# Patient Record
Sex: FEMALE | Race: WHITE | NOT HISPANIC OR LATINO | Employment: OTHER | URBAN - METROPOLITAN AREA
[De-identification: names, ages, dates, MRNs, and addresses within clinical notes are randomized per-mention and may not be internally consistent; named-entity substitution may affect disease eponyms.]

---

## 2017-03-03 ENCOUNTER — HOSPITAL ENCOUNTER (OUTPATIENT)
Dept: RADIOLOGY | Age: 72
Discharge: HOME | End: 2017-03-03
Payer: MEDICARE

## 2017-03-03 DIAGNOSIS — Z12.31 ENCOUNTER FOR SCREENING MAMMOGRAM FOR MALIGNANT NEOPLASM OF BREAST: ICD-10-CM

## 2017-03-03 PROCEDURE — G0202 SCR MAMMO BI INCL CAD: HCPCS

## 2021-02-11 DIAGNOSIS — Z23 ENCOUNTER FOR IMMUNIZATION: ICD-10-CM

## 2021-02-13 ENCOUNTER — IMMUNIZATIONS (OUTPATIENT)
Dept: FAMILY MEDICINE CLINIC | Facility: HOSPITAL | Age: 76
End: 2021-02-13

## 2021-02-13 DIAGNOSIS — Z23 ENCOUNTER FOR IMMUNIZATION: Primary | ICD-10-CM

## 2021-02-13 PROCEDURE — 0001A SARS-COV-2 / COVID-19 MRNA VACCINE (PFIZER-BIONTECH) 30 MCG: CPT

## 2021-02-13 PROCEDURE — 91300 SARS-COV-2 / COVID-19 MRNA VACCINE (PFIZER-BIONTECH) 30 MCG: CPT

## 2021-03-06 ENCOUNTER — IMMUNIZATIONS (OUTPATIENT)
Dept: FAMILY MEDICINE CLINIC | Facility: HOSPITAL | Age: 76
End: 2021-03-06

## 2021-03-06 DIAGNOSIS — Z23 ENCOUNTER FOR IMMUNIZATION: Primary | ICD-10-CM

## 2021-03-06 PROCEDURE — 0002A SARS-COV-2 / COVID-19 MRNA VACCINE (PFIZER-BIONTECH) 30 MCG: CPT

## 2021-03-06 PROCEDURE — 91300 SARS-COV-2 / COVID-19 MRNA VACCINE (PFIZER-BIONTECH) 30 MCG: CPT

## 2021-10-09 ENCOUNTER — IMMUNIZATIONS (OUTPATIENT)
Dept: FAMILY MEDICINE CLINIC | Facility: HOSPITAL | Age: 76
End: 2021-10-09

## 2021-10-09 DIAGNOSIS — Z23 ENCOUNTER FOR IMMUNIZATION: Primary | ICD-10-CM

## 2021-10-09 PROCEDURE — 91300 SARS-COV-2 / COVID-19 MRNA VACCINE (PFIZER-BIONTECH) 30 MCG: CPT

## 2021-10-09 PROCEDURE — 0001A SARS-COV-2 / COVID-19 MRNA VACCINE (PFIZER-BIONTECH) 30 MCG: CPT

## 2023-05-16 ENCOUNTER — HOSPITAL ENCOUNTER (OUTPATIENT)
Dept: RADIOLOGY | Facility: HOSPITAL | Age: 78
Discharge: HOME | End: 2023-05-16

## 2023-05-16 DIAGNOSIS — J44.9 CHRONIC OBSTRUCTIVE PULMONARY DISEASE, UNSPECIFIED COPD TYPE (HCC): ICD-10-CM

## 2023-06-12 ENCOUNTER — HOSPITAL ENCOUNTER (OUTPATIENT)
Dept: PULMONOLOGY | Facility: HOSPITAL | Age: 78
Discharge: HOME | End: 2023-06-12
Payer: MEDICARE

## 2023-06-12 DIAGNOSIS — J44.9 CHRONIC OBSTRUCTIVE PULMONARY DISEASE, UNSPECIFIED (HCC): ICD-10-CM

## 2023-06-12 PROCEDURE — 94729 DIFFUSING CAPACITY: CPT

## 2023-06-12 PROCEDURE — 94060 EVALUATION OF WHEEZING: CPT

## 2023-06-12 PROCEDURE — 94060 EVALUATION OF WHEEZING: CPT | Performed by: INTERNAL MEDICINE

## 2023-06-12 PROCEDURE — 94726 PLETHYSMOGRAPHY LUNG VOLUMES: CPT | Performed by: INTERNAL MEDICINE

## 2023-06-12 PROCEDURE — 94726 PLETHYSMOGRAPHY LUNG VOLUMES: CPT

## 2023-06-12 PROCEDURE — 94729 DIFFUSING CAPACITY: CPT | Performed by: INTERNAL MEDICINE

## 2023-06-12 PROCEDURE — 94760 N-INVAS EAR/PLS OXIMETRY 1: CPT

## 2023-06-12 RX ORDER — ALBUTEROL SULFATE 2.5 MG/3ML
2.5 SOLUTION RESPIRATORY (INHALATION) ONCE
Status: COMPLETED | OUTPATIENT
Start: 2023-06-12 | End: 2023-06-12

## 2023-06-12 RX ADMIN — ALBUTEROL SULFATE 2.5 MG: 2.5 SOLUTION RESPIRATORY (INHALATION) at 15:07

## 2024-03-14 ENCOUNTER — APPOINTMENT (EMERGENCY)
Dept: RADIOLOGY | Facility: HOSPITAL | Age: 79
End: 2024-03-14
Payer: MEDICARE

## 2024-03-14 ENCOUNTER — HOSPITAL ENCOUNTER (EMERGENCY)
Facility: HOSPITAL | Age: 79
Discharge: HOME | End: 2024-03-14
Attending: EMERGENCY MEDICINE
Payer: MEDICARE

## 2024-03-14 ENCOUNTER — APPOINTMENT (EMERGENCY)
Dept: CT IMAGING | Facility: HOSPITAL | Age: 79
End: 2024-03-14
Payer: MEDICARE

## 2024-03-14 VITALS
DIASTOLIC BLOOD PRESSURE: 59 MMHG | HEART RATE: 93 BPM | HEIGHT: 64 IN | SYSTOLIC BLOOD PRESSURE: 118 MMHG | WEIGHT: 93.03 LBS | BODY MASS INDEX: 15.88 KG/M2 | RESPIRATION RATE: 20 BRPM | TEMPERATURE: 98.8 F | OXYGEN SATURATION: 92 %

## 2024-03-14 DIAGNOSIS — J90 PLEURAL EFFUSION ON RIGHT: ICD-10-CM

## 2024-03-14 DIAGNOSIS — J18.9 PNEUMONIA OF RIGHT UPPER LOBE DUE TO INFECTIOUS ORGANISM: Primary | ICD-10-CM

## 2024-03-14 DIAGNOSIS — J43.9 COPD WITH EMPHYSEMA (HCC): ICD-10-CM

## 2024-03-14 LAB
2HR DELTA HS TROPONIN: -1 NG/L
ALBUMIN SERPL BCP-MCNC: 4.1 G/DL (ref 3.5–5)
ALP SERPL-CCNC: 105 U/L (ref 34–104)
ALT SERPL W P-5'-P-CCNC: 27 U/L (ref 7–52)
ANION GAP SERPL CALCULATED.3IONS-SCNC: 8 MMOL/L (ref 4–13)
AST SERPL W P-5'-P-CCNC: 24 U/L (ref 13–39)
BASOPHILS # BLD AUTO: 0.03 THOUSANDS/ÂΜL (ref 0–0.1)
BASOPHILS NFR BLD AUTO: 0 % (ref 0–1)
BILIRUB SERPL-MCNC: 0.57 MG/DL (ref 0.2–1)
BUN SERPL-MCNC: 14 MG/DL (ref 5–25)
CALCIUM SERPL-MCNC: 9.7 MG/DL (ref 8.4–10.2)
CARDIAC TROPONIN I PNL SERPL HS: 4 NG/L
CARDIAC TROPONIN I PNL SERPL HS: 5 NG/L
CHLORIDE SERPL-SCNC: 100 MMOL/L (ref 96–108)
CO2 SERPL-SCNC: 29 MMOL/L (ref 21–32)
CREAT SERPL-MCNC: 0.74 MG/DL (ref 0.6–1.3)
EOSINOPHIL # BLD AUTO: 0 THOUSAND/ÂΜL (ref 0–0.61)
EOSINOPHIL NFR BLD AUTO: 0 % (ref 0–6)
ERYTHROCYTE [DISTWIDTH] IN BLOOD BY AUTOMATED COUNT: 12.8 % (ref 11.6–15.1)
GFR SERPL CREATININE-BSD FRML MDRD: 77 ML/MIN/1.73SQ M
GLUCOSE SERPL-MCNC: 201 MG/DL (ref 65–140)
HCT VFR BLD AUTO: 49.9 % (ref 34.8–46.1)
HGB BLD-MCNC: 15.8 G/DL (ref 11.5–15.4)
IMM GRANULOCYTES # BLD AUTO: 0.06 THOUSAND/UL (ref 0–0.2)
IMM GRANULOCYTES NFR BLD AUTO: 1 % (ref 0–2)
LACTATE SERPL-SCNC: 1.2 MMOL/L (ref 0.5–2)
LYMPHOCYTES # BLD AUTO: 0.77 THOUSANDS/ÂΜL (ref 0.6–4.47)
LYMPHOCYTES NFR BLD AUTO: 6 % (ref 14–44)
MCH RBC QN AUTO: 30.8 PG (ref 26.8–34.3)
MCHC RBC AUTO-ENTMCNC: 31.7 G/DL (ref 31.4–37.4)
MCV RBC AUTO: 97 FL (ref 82–98)
MONOCYTES # BLD AUTO: 1.03 THOUSAND/ÂΜL (ref 0.17–1.22)
MONOCYTES NFR BLD AUTO: 8 % (ref 4–12)
NEUTROPHILS # BLD AUTO: 10.33 THOUSANDS/ÂΜL (ref 1.85–7.62)
NEUTS SEG NFR BLD AUTO: 85 % (ref 43–75)
NRBC BLD AUTO-RTO: 0 /100 WBCS
PLATELET # BLD AUTO: 225 THOUSANDS/UL (ref 149–390)
PMV BLD AUTO: 11.6 FL (ref 8.9–12.7)
POTASSIUM SERPL-SCNC: 3.9 MMOL/L (ref 3.5–5.3)
PROT SERPL-MCNC: 7.6 G/DL (ref 6.4–8.4)
RBC # BLD AUTO: 5.13 MILLION/UL (ref 3.81–5.12)
SODIUM SERPL-SCNC: 137 MMOL/L (ref 135–147)
WBC # BLD AUTO: 12.22 THOUSAND/UL (ref 4.31–10.16)

## 2024-03-14 PROCEDURE — 85025 COMPLETE CBC W/AUTO DIFF WBC: CPT | Performed by: EMERGENCY MEDICINE

## 2024-03-14 PROCEDURE — 71045 X-RAY EXAM CHEST 1 VIEW: CPT

## 2024-03-14 PROCEDURE — 83605 ASSAY OF LACTIC ACID: CPT | Performed by: PHYSICIAN ASSISTANT

## 2024-03-14 PROCEDURE — 80053 COMPREHEN METABOLIC PANEL: CPT | Performed by: EMERGENCY MEDICINE

## 2024-03-14 PROCEDURE — 93005 ELECTROCARDIOGRAM TRACING: CPT

## 2024-03-14 PROCEDURE — 36415 COLL VENOUS BLD VENIPUNCTURE: CPT

## 2024-03-14 PROCEDURE — 84484 ASSAY OF TROPONIN QUANT: CPT | Performed by: EMERGENCY MEDICINE

## 2024-03-14 PROCEDURE — 71275 CT ANGIOGRAPHY CHEST: CPT

## 2024-03-14 PROCEDURE — 99285 EMERGENCY DEPT VISIT HI MDM: CPT | Performed by: PHYSICIAN ASSISTANT

## 2024-03-14 PROCEDURE — 99285 EMERGENCY DEPT VISIT HI MDM: CPT

## 2024-03-14 RX ADMIN — IOHEXOL 45 ML: 350 INJECTION, SOLUTION INTRAVENOUS at 11:17

## 2024-03-14 NOTE — ED NOTES
Patient's O2 saturation was at 93% while resting and remained between 92-93% while ambulating.     Eloy Kerr  03/14/24 1223

## 2024-03-14 NOTE — ED PROVIDER NOTES
History  Chief Complaint   Patient presents with    Chest Pain     Pt reports chest and back pain onset yesterday, was seen yesterday at urgent care and reports was called for elevated d-dimer, cough/SOB, hx COPD     Patient is a 79-year-old female with PMH of smoking and COPD, presenting to the ED after receiving a phone call from [facility] this morning that she has an elevated D-dimer from her visit yesterday and that she should be seen at the emergency department. At [facility] she was diagnosed with a right upper lobe pneumonia and was given a course of doxycycline and prednisone. She has been experiencing some right-sided chest pain that radiates to her back. She also reports a cough and some shortness of breath, but states that she always has that due to her COPD. Her O2 sat on arrival was 93% and she states her baseline is about 95%, she is not on oxygen at home.         None       Past Medical History:   Diagnosis Date    COPD (chronic obstructive pulmonary disease) (HCC)        History reviewed. No pertinent surgical history.    History reviewed. No pertinent family history.  I have reviewed and agree with the history as documented.    E-Cigarette/Vaping     E-Cigarette/Vaping Substances     Social History     Tobacco Use    Smoking status: Former     Types: Cigarettes    Smokeless tobacco: Never       Review of Systems   Constitutional:  Negative for activity change, chills, diaphoresis, fatigue and fever.   Respiratory:  Positive for shortness of breath. Negative for chest tightness and wheezing.    Cardiovascular:  Positive for chest pain. Negative for leg swelling.   Gastrointestinal:  Negative for abdominal pain, diarrhea, nausea and vomiting.   Skin:  Negative for color change and pallor.   Neurological:  Negative for dizziness, syncope and light-headedness.   All other systems reviewed and are negative.      Physical Exam  Physical Exam  Constitutional:       General: She is not in acute  distress.     Appearance: She is not ill-appearing, toxic-appearing or diaphoretic.   Cardiovascular:      Rate and Rhythm: Regular rhythm. Tachycardia present.      Heart sounds: Normal heart sounds.   Pulmonary:      Effort: No accessory muscle usage or respiratory distress.      Breath sounds: Examination of the right-upper field reveals rhonchi. Rhonchi present. No decreased breath sounds, wheezing or rales.   Musculoskeletal:      Right lower leg: No tenderness. No edema.      Left lower leg: No tenderness. No edema.   Skin:     General: Skin is warm and dry.   Neurological:      General: No focal deficit present.      Mental Status: She is alert and oriented to person, place, and time.   Psychiatric:         Mood and Affect: Mood normal.         Behavior: Behavior normal.         Vital Signs  ED Triage Vitals [03/14/24 0952]   Temperature Pulse Respirations Blood Pressure SpO2   98.8 °F (37.1 °C) (!) 115 16 119/84 93 %      Temp Source Heart Rate Source Patient Position - Orthostatic VS BP Location FiO2 (%)   Oral Monitor Sitting Right arm --      Pain Score       --           Vitals:    03/14/24 0952 03/14/24 1110   BP: 119/84 118/59   Pulse: (!) 115 93   Patient Position - Orthostatic VS: Sitting Lying         Visual Acuity      ED Medications  Medications   iohexol (OMNIPAQUE) 350 MG/ML injection (MULTI-DOSE) 45 mL (45 mL Intravenous Given 3/14/24 1117)       Diagnostic Studies  Results Reviewed       Procedure Component Value Units Date/Time    HS Troponin I 2hr [[id number]]  (Normal) Collected: 03/14/24 1155    Lab Status: Final result Specimen: Blood from Arm, Left Updated: 03/14/24 1225     hs TnI 2hr 4 ng/L      Delta 2hr hsTnI -1 ng/L     Lactic acid, plasma (w/reflex if result > 2.0) [[id number]]  (Normal) Collected: 03/14/24 1155    Lab Status: Final result Specimen: Blood from Arm, Left Updated: 03/14/24 1215     LACTIC ACID 1.2 mmol/L     Narrative:      Result may be elevated if tourniquet was used  during collection.    Comprehensive metabolic panel [[id number]]  (Abnormal) Collected: 03/14/24 1001    Lab Status: Final result Specimen: Blood from Arm, Left Updated: 03/14/24 1044     Sodium 137 mmol/L      Potassium 3.9 mmol/L      Chloride 100 mmol/L      CO2 29 mmol/L      ANION GAP 8 mmol/L      BUN 14 mg/dL      Creatinine 0.74 mg/dL      Glucose 201 mg/dL      Calcium 9.7 mg/dL      AST 24 U/L      ALT 27 U/L      Alkaline Phosphatase 105 U/L      Total Protein 7.6 g/dL      Albumin 4.1 g/dL      Total Bilirubin 0.57 mg/dL      eGFR 77 ml/min/1.73sq m     Narrative:      National Kidney Disease Foundation guidelines for Chronic Kidney Disease (CKD):     Stage 1 with normal or high GFR (GFR > 90 mL/min/1.73 square meters)    Stage 2 Mild CKD (GFR = 60-89 mL/min/1.73 square meters)    Stage 3A Moderate CKD (GFR = 45-59 mL/min/1.73 square meters)    Stage 3B Moderate CKD (GFR = 30-44 mL/min/1.73 square meters)    Stage 4 Severe CKD (GFR = 15-29 mL/min/1.73 square meters)    Stage 5 End Stage CKD (GFR <15 mL/min/1.73 square meters)  Note: GFR calculation is accurate only with a steady state creatinine    HS Troponin 0hr (reflex protocol) [[id number]]  (Normal) Collected: 03/14/24 1001    Lab Status: Final result Specimen: Blood from Arm, Left Updated: 03/14/24 1037     hs TnI 0hr 5 ng/L     CBC and differential [[id number]]  (Abnormal) Collected: 03/14/24 1001    Lab Status: Final result Specimen: Blood from Arm, Left Updated: 03/14/24 1015     WBC 12.22 Thousand/uL      RBC 5.13 Million/uL      Hemoglobin 15.8 g/dL      Hematocrit 49.9 %      MCV 97 fL      MCH 30.8 pg      MCHC 31.7 g/dL      RDW 12.8 %      MPV 11.6 fL      Platelets 225 Thousands/uL      nRBC 0 /100 WBCs      Neutrophils Relative 85 %      Immature Grans % 1 %      Lymphocytes Relative 6 %      Monocytes Relative 8 %      Eosinophils Relative 0 %      Basophils Relative 0 %      Neutrophils Absolute 10.33 Thousands/µL      Absolute Immature  Grans 0.06 Thousand/uL      Absolute Lymphocytes 0.77 Thousands/µL      Absolute Monocytes 1.03 Thousand/µL      Eosinophils Absolute 0.00 Thousand/µL      Basophils Absolute 0.03 Thousands/µL                    CTA ED chest PE Study   Final Result by Chiquita Kee MD (03/14 1151)      No pulmonary embolus.      Mild right upper lobe pneumonia with trace right pleural effusion.      Severe emphysema.            Workstation performed: [id number]         XR chest portable   Final Result by Amrita Nicole MD (03/14 1434)      Emphysematous changes. Small focal opacity in the right upper lobe may represent developing pneumonia.            Workstation performed: [id number]                    Procedures  ECG 12 Lead Documentation Only    Date/Time: 3/14/2024 9:56 AM    Performed by: Satish Guerra PA-C  Authorized by: Satish Guerra PA-C    ECG reviewed by me, the ED Provider: yes    Patient location:  ED  Previous ECG:     Comparison to cardiac monitor: Yes    Interpretation:     Interpretation: normal    Rate:     ECG rate assessment: tachycardic    Rhythm:     Rhythm: sinus rhythm    Ectopy:     Ectopy: none    QRS:     QRS axis:  Normal  Conduction:     Conduction: normal    ST segments:     ST segments:  Normal  T waves:     T waves: normal    Comments:      Sinus tachycardia, otherwise normal EKG           ED Course  ED Course as of 03/17/24 1840   Thu Mar 14, 2024   1030 Discussed with staff need for EKG, tech partner performing    1148 RUL infiltrate c/w PNA.  NO obvious PE by my interpretation.  Lactate / fluids / ABx ordered.  PE official itnterpretation pending               HEART Risk Score      Flowsheet Row Most Recent Value   Heart Score Risk Calculator    History 0 Filed at: 03/14/2024 1326   ECG 0 Filed at: 03/14/2024 1326   Age 2 Filed at: 03/14/2024 1326   Risk Factors 1 Filed at: 03/14/2024 1326   Troponin 0 Filed at: 03/14/2024 1326   HEART Score 3 Filed at: 03/14/2024 1326                   PERC Rule for PE      Flowsheet Row Most Recent Value   PERC Rule for PE    Age >=50 1 Filed at: 03/14/2024 1047   HR >=100 1 Filed at: 03/14/2024 1047   O2 Sat on room air < 95% 1 Filed at: 03/14/2024 1047   History of PE or DVT 0 Filed at: 03/14/2024 1047   Recent trauma or surgery 0 Filed at: 03/14/2024 1047   Hemoptysis 0 Filed at: 03/14/2024 1047   Exogenous estrogen 0 Filed at: 03/14/2024 1047   Unilateral leg swelling 0 Filed at: 03/14/2024 1047   PERC Rule for PE Results 3 Filed at: 03/14/2024 1047                    Wells' Criteria for PE      Flowsheet Row Most Recent Value   Wells' Criteria for PE    Clinical signs and symptoms of DVT 0 Filed at: 03/14/2024 1047   PE is primary diagnosis or equally likely 3 Filed at: 03/14/2024 1047   HR >100 1.5 Filed at: 03/14/2024 1047   Immobilization at least 3 days or Surgery in the previous 4 weeks 0 Filed at: 03/14/2024 1047   Previous, objectively diagnosed PE or DVT 0 Filed at: 03/14/2024 1047   Hemoptysis 0 Filed at: 03/14/2024 1047   Malignancy with treatment within 6 months or palliative 0 Filed at: 03/14/2024 1047   Wells' Criteria Total 4.5 Filed at: 03/14/2024 1047                  Medical Decision Making  Differential diagnosis includes: PE, ACS, pneumonia, pneumothorax, COPD exacerbation, viral pleurisy. CTA chest showed no pulmonary embolus or pneumothorax, just right upper lobe infiltrate consistent with the diagnosis of pneumonia she was given yesterday. Her exam was straightforward and there was no immediate life threat. Exam was not consistent with cardiac source and her HEART score was low. She maintained 92-93% O2 sat while ambulating. She was advised to continue the doxycycline that she was prescribed yesterday and to finish the whole course of 10 days. Her pain resolved while in the ED.     Discussed return emergency department for any newly developing or worsening signs or symptoms.  Patient understood all instructions prior to  discharge and plan agreed upon by patient and myself. Discussed overall common severe and commonly common side effects of prescription medication and advised review of all prescription package inserts for personal review prior to taking prescribed medications.      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  Radiology: ordered.    Risk  Prescription drug management.             Disposition  Final diagnoses:   Pneumonia of right upper lobe due to infectious organism   COPD with emphysema (HCC)   Pleural effusion on right     Time reflects when diagnosis was documented in both MDM as applicable and the Disposition within this note       Time User Action Codes Description Comment    3/14/2024 12:40 PM Satish Guerra Add [J18.9] Pneumonia of right upper lobe due to infectious organism     3/14/2024 12:44 PM Satish Guerra Add [J43.9] COPD with emphysema (HCC)     3/14/2024 12:44 PM Satish Guerra Add [J90] Pleural effusion on right           ED Disposition       ED Disposition   Discharge    Condition   Stable    Date/Time   Thu Mar 14, 2024 1239    Comment   Estela Javed discharge to home/self care.                   Follow-up Information       Follow up With Specialties Details Why Contact Info Additional Information    Addison Ha MD Internal Medicine Call in 1 day Follow-up for pneumonia to ensure it is not worsening [address]  [address]  [facility] [address]  [phone]       [facility] Emergency Medicine Go to  If symptoms worsen [facility] [facility] [address]  [phone] [facility], [facility] [place], [place], [address]            There are no discharge  medications for this patient.      No discharge procedures on file.    PDMP Review       None            ED Provider  Electronically Signed by             Satish Guerra PA-C  03/17/24 0519

## 2024-03-14 NOTE — DISCHARGE INSTRUCTIONS
Return emergency department for any newly developing or worsening signs or symptoms.  Ensure to review again any side effects of prescription medication(s) prescribed and review all prescription package inserts for personal review prior to taking prescribed medications.   Call to schedule a follow up appointment post ED follow up to address non emergent follow up findings and or to schedule follow up exam.   Continue doxycycline 100 mg twice daily for the full 10 days.

## 2024-03-16 LAB
ATRIAL RATE: 111 BPM
P AXIS: 87 DEGREES
PR INTERVAL: 144 MS
QRS AXIS: 65 DEGREES
QRSD INTERVAL: 82 MS
QT INTERVAL: 312 MS
QTC INTERVAL: 424 MS
T WAVE AXIS: 64 DEGREES
VENTRICULAR RATE: 111 BPM

## 2024-03-16 PROCEDURE — 93010 ELECTROCARDIOGRAM REPORT: CPT | Performed by: INTERNAL MEDICINE

## 2024-03-18 ENCOUNTER — TELEPHONE (OUTPATIENT)
Age: 79
End: 2024-03-18

## 2024-03-18 NOTE — TELEPHONE ENCOUNTER
Estela called in to see if there was a sooner appt with  . Offer the patient if she will like to be seen sooner with a different provider pt decline . Placed patient on wait list

## 2024-04-03 ENCOUNTER — OFFICE VISIT (OUTPATIENT)
Dept: PULMONOLOGY | Facility: CLINIC | Age: 79
End: 2024-04-03
Payer: MEDICARE

## 2024-04-03 VITALS
HEART RATE: 95 BPM | TEMPERATURE: 95.9 F | OXYGEN SATURATION: 91 % | DIASTOLIC BLOOD PRESSURE: 62 MMHG | SYSTOLIC BLOOD PRESSURE: 110 MMHG

## 2024-04-03 DIAGNOSIS — F17.210 CIGARETTE NICOTINE DEPENDENCE WITHOUT COMPLICATION: ICD-10-CM

## 2024-04-03 DIAGNOSIS — J44.9 COPD, SEVERE (HCC): Primary | ICD-10-CM

## 2024-04-03 DIAGNOSIS — J18.9 PNEUMONIA OF RIGHT UPPER LOBE DUE TO INFECTIOUS ORGANISM: ICD-10-CM

## 2024-04-03 PROCEDURE — 99204 OFFICE O/P NEW MOD 45 MIN: CPT | Performed by: INTERNAL MEDICINE

## 2024-04-03 RX ORDER — PREDNISONE 10 MG/1
TABLET ORAL
COMMUNITY
Start: 2024-03-13 | End: 2024-04-03

## 2024-04-03 RX ORDER — EPINEPHRINE 0.3 MG/.3ML
INJECTION SUBCUTANEOUS
COMMUNITY
Start: 2024-03-27

## 2024-04-03 RX ORDER — UMECLIDINIUM BROMIDE AND VILANTEROL TRIFENATATE 62.5; 25 UG/1; UG/1
POWDER RESPIRATORY (INHALATION)
COMMUNITY
Start: 2024-03-26

## 2024-04-03 RX ORDER — UBIDECARENONE 200 MG
200 CAPSULE ORAL DAILY
COMMUNITY

## 2024-04-03 RX ORDER — IPRATROPIUM BROMIDE AND ALBUTEROL 20; 100 UG/1; UG/1
SPRAY, METERED RESPIRATORY (INHALATION)
COMMUNITY
Start: 2024-03-20

## 2024-04-03 RX ORDER — ALBUTEROL SULFATE 90 UG/1
2 AEROSOL, METERED RESPIRATORY (INHALATION) EVERY 6 HOURS PRN
Qty: 18 G | Refills: 5 | Status: SHIPPED | OUTPATIENT
Start: 2024-04-03

## 2024-04-03 RX ORDER — DOXYCYCLINE HYCLATE 100 MG
TABLET ORAL
COMMUNITY
Start: 2024-03-13 | End: 2024-04-03

## 2024-04-03 NOTE — PROGRESS NOTES
Pulmonary Outpatient Consultation Note   Estela Javed 79 y.o. female MRN: [id number]  4/3/2024    Referring provider: No referring provider defined for this encounter.     Assessment/Plan:      COPD, severe (HCC)  We reviewed PFTs today.  She does have severe COPD with hyperinflation and she remains symptomatic despite use of Anoro Ellipta once daily.  I would favor transitioning her rescue inhaler from Combivent to albuterol to reduce the anticholinergic effects, as she has having some dry mouth.  She will be enrolling in pulmonary rehabilitation.  We briefly discussed the option of bronchoscopic lung volume reduction.  Based on PFTs from June 2023, she would be a candidate based on FEV1 and degree of hyperinflation, however her diffusion capacity was only 10% of predicted, which ideally should be greater than 20%.  More importantly, she would need to quit smoking a minimum of 4 months to be considered a candidate.  With her upcoming CT to ensure resolution of pneumonia, we will also perform with [place] protocol to analyze for whether there would be a target lobe to have more detailed discussion regarding valves.    Pneumonia of right upper lobe due to infectious organism  Repeat chest CT in June to ensure resolution of pneumonia.  Will also be performed with valve protocol as above    Cigarette nicotine dependence without complication  We discussed the importance of smoking cessation.  She verbalizes understanding.  Patient states she was able to quit for 5 years in the past and will do her very best to quit altogether now.    Visit orders:    Diagnoses and all orders for this visit:    COPD, severe (HCC)  -     albuterol (Ventolin HFA) 90 mcg/act inhaler; Inhale 2 puffs every 6 (six) hours as needed for wheezing  -     CT chest without contrast; Future    Cigarette nicotine dependence without complication    Pneumonia of right upper lobe due to infectious organism  -     CT chest without contrast;  Future    Other orders  -     Cholecalciferol 125 MCG (5000 UT) TABS; Take 5,000 Units by mouth daily  -     Coenzyme Q10 200 MG capsule; Take 200 mg by mouth daily  -     Discontinue: doxycycline hyclate (VIBRA-TABS) 100 mg tablet  -     EPINEPHrine (EPIPEN) 0.3 mg/0.3 mL SOAJ; INJECT 1 PEN IN THE MUSCLE SINGLE DOSE AS NEEDED FOR ANAPHYLAXIS. REPEAT DOSE IN 5-10 MINUTES IF SYMPTOMS PERSIST. CALL 911 OR GO TO ER IF NEEDED  -     Combivent Respimat inhaler; INHALE 1 PUFF BY MOUTH EVERY 4 HOURS AS DIRECTED AS NEEDED  -     Discontinue: predniSONE 10 mg tablet; Take 2 tablets (20 mg) by mouth daily for 3 days then 1 tablet (10 mg) daily for 3 days  -     Anoro Ellipta 62.5-25 MCG/ACT inhaler      History of Present Illness   HPI:  Estela Javed is a 79 y.o. female who to establish care related to COPD.  Patient reports having symptoms of COPD for several years, only recently given the formal diagnosis.  She was initially treated with Breztri, however felt that it caused increased congestion and more recently was transitioned to Anoro Ellipta.  She uses Combivent as a rescue inhaler.  She was recently seen at urgent care with chest discomfort.  D-dimer was elevated, prompting CT of the chest to be performed.  This was negative for pulmonary embolism, but did show right upper lobe pneumonia with small right pleural effusion.  She was treated with a course of doxycycline and also prescribed prednisone, though she only took 1 dose of that.  She has occasional mucus.  Denies hemoptysis.  Patient has a longstanding smoking history of approximately 30 pack years, currently smoking intermittently.  She was referred to pulmonary rehabilitation and will be starting in the near future.    Patient follows with Dr. Robertson from an allergy standpoint.  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   HENT:  Positive for postnasal  drip, rhinorrhea and sneezing. Negative for ear pain, sore throat and trouble swallowing.    Respiratory:  Positive for shortness of breath.    Cardiovascular:  Negative for chest pain.   Musculoskeletal:  Negative for myalgias.   Neurological:  Negative for headaches.   All other systems reviewed and are negative.      Historical Information   Past Medical History:   Diagnosis Date    COPD (chronic obstructive pulmonary disease) ([facility])     Emphysema of lung ([facility]) 5/2023    Pleural effusion 3/14/24    Pneumonia 3/13/24     Past Surgical History:   Procedure Laterality Date    CHOLECYSTECTOMY  5/2014     Family History   Problem Relation Age of Onset    COPD Father     COPD Brother        Occupational History: Retired     Social History     Tobacco Use   Smoking Status Some Days    Current packs/day: 0.25    Average packs/day: 0.3 packs/day for 49.7 years (12.4 ttl pk-yrs)    Types: Cigarettes    Start date: 8/8/1974   Smokeless Tobacco Never   Tobacco Comments    Quit [phone]       Meds/Allergies     Current Outpatient Medications:     albuterol (Ventolin HFA) 90 mcg/act inhaler, Inhale 2 puffs every 6 (six) hours as needed for wheezing, Disp: 18 g, Rfl: 5    Anoro Ellipta 62.5-25 MCG/ACT inhaler, , Disp: , Rfl:     Cholecalciferol 125 MCG (5000 UT) TABS, Take 5,000 Units by mouth daily, Disp: , Rfl:     Coenzyme Q10 200 MG capsule, Take 200 mg by mouth daily, Disp: , Rfl:     Combivent Respimat inhaler, INHALE 1 PUFF BY MOUTH EVERY 4 HOURS AS DIRECTED AS NEEDED, Disp: , Rfl:     EPINEPHrine (EPIPEN) 0.3 mg/0.3 mL SOAJ, INJECT 1 PEN IN THE MUSCLE SINGLE DOSE AS NEEDED FOR ANAPHYLAXIS. REPEAT DOSE IN 5-10 MINUTES IF SYMPTOMS PERSIST. CALL 911 OR GO TO ER IF NEEDED, Disp: , Rfl:   Allergies   Allergen Reactions    Bacteriophages Shortness Of Breath    Bee Venom Anaphylaxis    Ciprofloxacin Dizziness, Other (See Comments), Palpitations, Shortness Of Breath and Tremor     Tremors, heart palpitations  "   Sulfa Antibiotics Shortness Of Breath and Wheezing     Had trouble swallowing    Nitrofurantoin GI Intolerance and Nausea Only    Oxycodone-Aspirin Rash    Penicillins Diarrhea    Shellfish Allergy - Food Allergy Hives and Vomiting    Hornet Venom Itching and Rash    Mixed Vespid Venom Itching and Rash    Other Itching and Rash    Wasp Venom Itching and Rash    Yellow Jacket Venom Itching and Rash       Vitals: Blood pressure 110/62, pulse 95, temperature (!) 95.9 °F (35.5 °C), temperature source Tympanic, SpO2 91%., There is no height or weight on file to calculate BMI. Oxygen Therapy  SpO2: 91 %  Oxygen Therapy: None (Room air)    Physical Exam   Physical Exam  Constitutional:       General: She is not in acute distress.     Appearance: Normal appearance.   HENT:      Head: Normocephalic.      Mouth/Throat:      Pharynx: No oropharyngeal exudate.   Eyes:      General: No scleral icterus.  Neck:      Vascular: No JVD.   Cardiovascular:      Rate and Rhythm: Normal rate and regular rhythm.   Abdominal:      Palpations: Abdomen is soft.      Tenderness: There is no abdominal tenderness.   Musculoskeletal:         General: No deformity.      Cervical back: Neck supple.   Lymphadenopathy:      Cervical: No cervical adenopathy.   Skin:     General: Skin is warm and dry.   Neurological:      Mental Status: She is alert and oriented to person, place, and time.   Psychiatric:         Mood and Affect: Mood normal.         Labs: I have personally reviewed pertinent lab results.  Lab Results   Component Value Date    WBC 12.22 (H) 03/14/2024    HGB 15.8 (H) 03/14/2024    HCT 49.9 (H) 03/14/2024    MCV 97 03/14/2024     03/14/2024     Lab Results   Component Value Date    GLUCOSE 87 12/30/2014    CALCIUM 9.7 03/14/2024     12/30/2014    K 3.9 03/14/2024    CO2 29 03/14/2024     03/14/2024    BUN 14 03/14/2024    CREATININE 0.74 03/14/2024     No results found for: \"IGE\"  Lab Results   Component Value Date "    ALT 27 03/14/2024    AST 24 03/14/2024    GGT 98 (H) 12/30/2014    ALKPHOS 105 (H) 03/14/2024    BILITOT 0.5 12/30/2014       Imaging and other studies: I have personally reviewed pertinent reports.   and I have personally reviewed pertinent films in PACS  Chest CT on 3/14/2024 shows severe emphysema, no pulmonary embolism, patchy consolidation in the right upper lobe and small right pleural effusion    Pulmonary function testing:  Performed 6/12/2023 and personally interpreted  FEV1/FVC ratio 44%    FEV1 0.73, 33% predicted  FVC 59% predicted  No response to bronchodilators   % predicted   % predicted  DLCO corrected for hemoglobin 10 % predicted  PFTs show severe obstruction, moderate air trapping and severe diffusion impairment    Answers submitted by the patient for this visit:  Pulmonology Questionnaire (Submitted on 4/2/2024)  Chief Complaint: Primary symptoms  Chronicity: chronic  When did you first notice your symptoms?: more than 1 year ago  How often do your symptoms occur?: daily  Since you first noticed this problem, how has it changed?: waxing and waning  Do you have shortness of breath that occurs with effort or exertion?: Yes  Do you have ear congestion?: No  Do you have heartburn?: No  Do you have fatigue?: Yes  Do you have nasal congestion?: No  Do you have shortness of breath when lying flat?: No  Do you have shortness of breath when you wake up?: Yes  Do you have sweats?: No  Have you experienced weight loss?: No  Which of the following makes your symptoms worse?: climbing stairs, emotional stress, exercise, strenuous activity  Risk factors for lung disease: smoking/tobacco exposure

## 2024-04-03 NOTE — ASSESSMENT & PLAN NOTE
We reviewed PFTs today.  She does have severe COPD with hyperinflation and she remains symptomatic despite use of Anoro Ellipta once daily.  I would favor transitioning her rescue inhaler from Combivent to albuterol to reduce the anticholinergic effects, as she has having some dry mouth.  She will be enrolling in pulmonary rehabilitation.  We briefly discussed the option of bronchoscopic lung volume reduction.  Based on PFTs from June 2023, she would be a candidate based on FEV1 and degree of hyperinflation, however her diffusion capacity was only 10% of predicted, which ideally should be greater than 20%.  More importantly, she would need to quit smoking a minimum of 4 months to be considered a candidate.  With her upcoming CT to ensure resolution of pneumonia, we will also perform with [place] protocol to analyze for whether there would be a target lobe to have more detailed discussion regarding valves.

## 2024-04-03 NOTE — ASSESSMENT & PLAN NOTE
We discussed the importance of smoking cessation.  She verbalizes understanding.  Patient states she was able to quit for 5 years in the past and will do her very best to quit altogether now.

## 2024-04-03 NOTE — ASSESSMENT & PLAN NOTE
Repeat chest CT in June to ensure resolution of pneumonia.  Will also be performed with valve protocol as above

## 2024-05-03 PROBLEM — J18.9 PNEUMONIA OF RIGHT UPPER LOBE DUE TO INFECTIOUS ORGANISM: Status: RESOLVED | Noted: 2024-04-03 | Resolved: 2024-05-03

## 2024-05-08 ENCOUNTER — CLINICAL SUPPORT (OUTPATIENT)
Dept: PULMONOLOGY | Facility: CLINIC | Age: 79
End: 2024-05-08
Payer: MEDICARE

## 2024-05-08 DIAGNOSIS — J44.9 COPD, SEVERE (HCC): ICD-10-CM

## 2024-05-08 PROCEDURE — 94625 PHY/QHP OP PULM RHB W/O MNTR: CPT

## 2024-05-08 NOTE — PROGRESS NOTES
PULMONARY REHABILITATION   ASSESSMENT AND INDIVIDUALIZED TREATMENT PLAN  INITIAL     Today's date: May 8, 2024  # of Exercise Sessions Completed: 1 - Eval   Patient name: Estela Javed     : 1945       MRN: [id number]  PCP: Addison Ha MD  Referring Physician: Delmer Robertson MD  Pulmonologist: Mariajose Worrell DO     Provider: Eyad  Clinician: Estephania Castrejon MS, CEP      ASSESSMENT    PULMONARY   Dx:   Encounter Diagnosis   Name Primary?    COPD, severe (HCC)      Date of onset: 4/3/24  Description of Diagnosis: COPD, very severe - recently diagnosed, knew due to increased SOB at home   Other Pulmonary History: recent hospitalization due to pneumonia    COMMENTS: Looking at get qualified for [place]     PFT:    does have a PFT on file  FEV1/FVC ratio of 44% and an   FEV1 of 33% predicted  very severeobstruction.    Pulmonary Disease Risk Factors:  none    Medical History:   Past Medical History:   Diagnosis Date    COPD (chronic obstructive pulmonary disease) ([facility])     Emphysema of lung ([facility]) 2023    Pleural effusion 3/14/24    Pneumonia 3/13/24       Family History:  Family History   Problem Relation Age of Onset    COPD Father     COPD Brother        Allergies:   Bacteriophages, Bee venom, Ciprofloxacin, Sulfa antibiotics, Nitrofurantoin, Oxycodone-aspirin, Penicillins, Shellfish allergy - food allergy, Hornet venom, Mixed vespid venom, Other, Wasp venom, and Yellow jacket venom    Current Medications:   Current Outpatient Medications   Medication Sig Dispense Refill    albuterol (Ventolin HFA) 90 mcg/act inhaler Inhale 2 puffs every 6 (six) hours as needed for wheezing 18 g 5    Anoro Ellipta 62.5-25 MCG/ACT inhaler       Cholecalciferol 125 MCG (5000 UT) TABS Take 5,000 Units by mouth daily      Coenzyme Q10 200 MG capsule Take 200 mg by mouth daily      Combivent Respimat inhaler INHALE 1 PUFF BY MOUTH EVERY 4 HOURS AS DIRECTED AS NEEDED      EPINEPHrine (EPIPEN) 0.3 mg/0.3 mL SOAJ INJECT  "1 PEN IN THE MUSCLE SINGLE DOSE AS NEEDED FOR ANAPHYLAXIS. REPEAT DOSE IN 5-10 MINUTES IF SYMPTOMS PERSIST. CALL 911 OR GO TO ER IF NEEDED       No current facility-administered medications for this visit.       Medication compliance: Yes   Comments: Pt reports to be compliant with medications    Cultural needs: none    Readiness to change: Preparation:  (Getting ready to change)       EXERCISE ASSESSMENT:      FUNCTIONAL STATUS:  Current Status:  Occupation: retired  Recreation: [employer]   ADL’s:Capable of performing light ADLs only limited by Dyspnea, Weakness  [place]: No limitations  Home Exercise/Equipment: light walking - leisure  Other: none     Physical Limitations: herniated discs in lower back     Fall Risk: Low   Comments: Ambulates with a steady gait with no assist device    Initial Fitness Assessment: 6MWT:  Resting:    Resting:  BP: 104/70  HR: 86  SpO2: 96%  Dyspnea: 0, Exercise:  BP: 142/74  HR: 102  SpO2: 88%  Dyspnea: 6, METs:  2.2, and ECG Summary: NSR with occ PVCs       NUTRITION ASSESSMENT:    Height:   Ht Readings from Last 1 Encounters:   03/14/24 5' 4\" (1.626 m)       Weight control:    Starting weight: 93 lbs    Current weight:     Rate Your Plate Score: 65/81    Diabetes: N/A    Current Dietary Habits:  Minimal dairy   Rare meat eater   Has IBS - limites diet slightly     Drug/Alcohol Use: No      PSYCHOSOCIAL ASSESSMENT:    Depression screening:  PHQ-9 = 3    Interpretation:  1-4 = Minimal Depression  Last Assessed: 5/8/24    Anxiety screening:  THOMAS-7 = 1    Interpretation: 0-4  = Not anxious  Last Assessed: 5/8/24    Pt self-report of depression and anxiety:   Patient reports feelings of anxiety  Reports sufficient emotional support     Self-reported stress level:  5   Stressors:  house problems   Stress Management Tools: practice Relaxation Techniques, exercise, keep a positive mindset, spend time outside, and enjoy a hobby    Quality of Life Screen:  (Higher score " indicates disease impact on QOL)  [facility] COOP score: 25/40        Social Support: children, siblings, and friends  Community/Social Activities: none      Psychosocial Assessment as it relates to rehabilitation:   Patient denies issues with his/her family or home life that may affect their rehabilitation efforts.       OTHER CORE COMPONENT ASSESSMENT:    Tobacco Use: Brief counseling by cardiac rehab professional  Date: 5/8/24  Pt continues to smoke 0.25 ppd   Tobacco Intervention: Discussed barriers to quitting and steps to work toward a quit date    Hospitalizations in the past year: 1    Oxygen needs:   Rest:  room air  Exercise/physical activity:  room air  Sleep:   room air    Does Pt monitor home SpO2? yes   Average SpO2 at rest: 90%   Average SpO2 with ADLs/physical activity: 90%    Breathing Treatments:   Rescue Inhaler:  PRN  Maintenance Inhaler: Yes:  1 times per day  Nebulizer Treatments:  No  Patient practices breathing techniques at home:  Reviewed with patient on:  5/8/24      INDIVIDUALIZED TREATMENT PLAN    Patient will attend 23 monitored exercise sessions beginning 5/13/24.    See outlined plan of care below for specific patient goals in each component of care.      PATIENT SPECIFIC GOALS:     Exercise: (home exercise, ADLs)  reduced dependence on supplemental O2, attend pulmonary rehab regularly, decrease sitting time at home, start a walking program, begin a consistent exercise regimen , decreased rest needed with physical activity/exercise, increased muscular strength, increased energy, and increased stamina with ADLs  Improve exercise outside - walking    Nutrition: (wt control, diabetes management, dietary modifications)  increase water intake to reduce/thin mucus production eat less CO2 producing foods improve hydration weight gain   Psychosocial: (stress, emotional well being, social support)  maintain compliance with medical therapy  Find ways to get out of the house   Consider different  "relaxation methods instead of smoking    Core Component: (smoking, BP control, angina control, medication)  reduced dietary sodium <2000mg, follow fluid restriction as advised, medication compliance, and tobacco cessation in 12  weeks    SMART GOALS:  Exercise:   reduced score on CAT, improved 6MWT distance, reduced dyspnea during exercise, improved exercise tolerance based on peak METs tolerated in pulmonary rehab exercise session, SpO2 >88% during exercise, and reduced RPD at rest   Nutrition:   Improved Rate Your Plate score  >64, choose 1% or skim milk, use \"light\" tub margarine on bread, potatoes and vegetables, and choose healthy snacks such as fruit, pretzels, and low fat crackers   Psychosocial:   Reduce perceived stress to 1-3/10, improved [facility] QOL < 27, Physical Fitness in [facility] Score < 3, Social Support in [facility] Score < 3, Daily Activity in [facility] Score < 3, Social Activities in [facility] Score < 3, Pain in [facility] Score < 3, Overall Health in [facility] Score < 3, Quality of Life in [facility] Score < 3 , Change in Health in [facility] Score < 3 , reduced incidence of restlessness and/or lethargy, reduced time feeling nervous or on edge, and take time to relax    Core Components:   consistent, controlled resting BP < 130/80, reduced number of cigarettes per day, and medication compliance      EXERCISE PLAN    Progress toward SMART and personal Exercise goals:  Patient going to attend 2x/wk for 24 sessions total     Group and Individual Education: pursed lipped breathing, diaphragmatic breathing, fall prevention while using nasal canula tubing, relaxation breathing, home exercise guidelines, benefits of exercise for pulmonary disease, RPE scale, RPD scale, and O2 saturation monitoring      Plan for next 30 days:    Titrate supplemental oxygen as needed to maintain SpO2>88% with exercise, learn to conserve energy with ADLs , practice diaphragmatic breathing, reduce time sitting at home, and " increased strength of respiratory muscles    Current Aerobic Exercise Prescription:      Frequency: 2 days/week *Supplement with home exercise 2+ days/wk as tolerated        Minutes: 20-40         METS: 2-3              SpO2: >88%              RPD: 4-6          HR: RHR +30-40bpm   RPE: 4-6         Modalities: Treadmill, UBE, NuStep, and Recumbent bike     Exercise workloads will be progressed gradually as tolerated, within limits of patient's ability, and according to the patient's response to the exercise program.    Aerobic Exercise Prescription - 30 day goal:   Frequency: 12 days/week *Supplement with home exercise 2+ days/wk as tolerated        Minutes: 20-40         METS: 2.5-3.5              SpO2: >88%              RPD: 4-6          HR: RHR +30-40bpm   RPE: 4-6         Modalities: Treadmill, UBE, NuStep, and Recumbent bike     Strength training:  Will be added following 2-3 weeks of monitored exercise sessions   Modalities: Lateral Raise, Arm Curl, Sit to Stands, Upright Rows, and Front Raises    Supplemental Oxygen Needs with Exercise:  room air.    Home Exercise: none    The patient was counseled on exercise guidelines to achieve a minimum of 150 mins/wk of moderate intensity (RPE 4-6) exercise and encouraged to add 1-2 days of exercise on opposite days of cardiac rehab as tolerated.       NUTRITION PLAN    Patient's progress toward SMART and personal Nutrition goals:   Patient is agreeable to making dietary modifications.    Group and Individual Education:   heart healthy eating principles  maintaining hydration    Plan for next 30 days:   group class: Reading Food Labels, group Class: Heart Healthy Eating, choose healthy meals while dining out, use light or fat-free salad dressings and mayonnaise, eat healthy snacks like fruit, pretzels, and low fat crackers, choose desserts such as fruit, francis food cake, low-fat or fat-free sweets, never/rarely add salt to food when cooking or at the table, rarely/never  eat salty snacks, and choose low sugar desserts and sweets    Measurable goals were based Rate Your Plate Dietary Self-Assessment. These are the areas in which the patient could score higher on the assessment. Goals include recommendations for a heart healthy diet based on American Heart Association.      PSYCHOSOCIAL PLAN    Patient's progress toward SMART and personal Psychosocial goals:   Acknowledges anxiety problems     Group and Individual Education: stress management techniques, tools to manage fear/anxiety related to becoming SOB, and benefits of mental health counseling    Plan for next 30 days:   Class: Stress and Your Health, Class: Relaxation, Class:  Stress and Pulmonary Disease, Refer to behavioral health/counseling, Practice relaxation techniques, Exercise, Learn how to relax and slow down, Join a support group , and Meet new people    Information to utilize [employer] was provided as well as contact information for counseling through  Behavioral Health and group psychotherapy groups available.      OTHER CORE COMPONENTS PLAN    Blood pressure:    Restin/70   Exercise: 162/74   Recovery: 118/76    Pt will be encouraged to monitor home BP if advised by cardiologist.    Progress toward SMART and personal Core Component goals:   Patient is agreeable to attend cardiopulmonary rehab exercise sessions 3x/wk x 36 sessions.    Group and Individual Education:  effects of nicotine and tobacco, identifying smoking triggers, how to set a smoking cessation plan, and smoking cessation tools and methods    Plan for next 30 days:   group class: Understanding Heart Disease, group class: Common Heart Medications, medication compliance, set target quit date for smoking, reduce number of cigarettes per day, avoid places with second hand smoke, engage in regular exercise, group class: Pulmonary Anatomy and Physiology, group class:  Pulmonary Medications, and refer to [facility] Cessation [facility]

## 2024-05-13 ENCOUNTER — CLINICAL SUPPORT (OUTPATIENT)
Dept: PULMONOLOGY | Facility: CLINIC | Age: 79
End: 2024-05-13
Payer: MEDICARE

## 2024-05-13 DIAGNOSIS — J44.9 COPD, SEVERE (HCC): Primary | ICD-10-CM

## 2024-05-13 PROCEDURE — 94625 PHY/QHP OP PULM RHB W/O MNTR: CPT

## 2024-05-15 ENCOUNTER — APPOINTMENT (OUTPATIENT)
Dept: PULMONOLOGY | Facility: CLINIC | Age: 79
End: 2024-05-15
Payer: MEDICARE

## 2024-05-20 ENCOUNTER — APPOINTMENT (OUTPATIENT)
Dept: PULMONOLOGY | Facility: CLINIC | Age: 79
End: 2024-05-20
Payer: MEDICARE

## 2024-05-20 ENCOUNTER — NURSE TRIAGE (OUTPATIENT)
Age: 79
End: 2024-05-20

## 2024-05-20 NOTE — TELEPHONE ENCOUNTER
"Incoming call:    Patient states that she has been experiencing:  Shakiness, dizziness, weakness, headaches, tachycardia and malaise over the past 6 days. Mentioned using Combivent quite a few times last week.     Taking Tylenol/advil for headaches with relief.  Stopped taking the Anoro over the weekend and felt slightly better. Has taken the Anoro again this morning. Last Combivent was several days ago.    Please advise      Reason for Disposition   Caller has NON-URGENT medicine question about med that PCP or specialist prescribed and triager unable to answer question    Answer Assessment - Initial Assessment Questions  1. NAME of MEDICATION: \"What medicine are you calling about?\"      Combivent vs Anoro  2. QUESTION: \"What is your question?\" (e.g., medication refill, side effect)      Side effect  3. PRESCRIBING HCP: \"Who prescribed it?\" Reason: if prescribed by specialist, call should be referred to that group.      Dr. Worrell  4. SYMPTOMS: \"Do you have any symptoms?\"      Weakness, shaky, dizzy, headache, malaise, and tachycardia  5. SEVERITY: If symptoms are present, ask \"Are they mild, moderate or severe?\"      severe    Protocols used: Medication Question Call-ADULT-[place]    "

## 2024-05-21 NOTE — TELEPHONE ENCOUNTER
I spoke with Estela regarding her symptoms.  She reports that when taking the Anoro yesterday, she had recurrence of symptoms though mild.  She will hold the Anoro for a few days and when symptoms completely resolve, she will return to Anoro and stop the Combivent.  She will use her albuterol in place of the Combivent to limit LAMA duplication.  If symptoms return, she will return call to the office and we can change her maintenance therapy.  She is agreeable.  I advised her that if she has continued symptoms, she should reach out to her PCP as well to evaluate other etiologies.  Dr. Worrell aware.

## 2024-05-22 ENCOUNTER — APPOINTMENT (OUTPATIENT)
Dept: PULMONOLOGY | Facility: CLINIC | Age: 79
End: 2024-05-22
Payer: MEDICARE

## 2024-05-29 ENCOUNTER — CLINICAL SUPPORT (OUTPATIENT)
Dept: PULMONOLOGY | Facility: CLINIC | Age: 79
End: 2024-05-29
Payer: MEDICARE

## 2024-05-29 DIAGNOSIS — J44.9 COPD, SEVERE (HCC): Primary | ICD-10-CM

## 2024-05-29 PROCEDURE — 94625 PHY/QHP OP PULM RHB W/O MNTR: CPT

## 2024-06-03 ENCOUNTER — CLINICAL SUPPORT (OUTPATIENT)
Dept: PULMONOLOGY | Facility: CLINIC | Age: 79
End: 2024-06-03
Payer: MEDICARE

## 2024-06-03 ENCOUNTER — HOSPITAL ENCOUNTER (OUTPATIENT)
Dept: CT IMAGING | Facility: HOSPITAL | Age: 79
Discharge: HOME | End: 2024-06-03
Payer: MEDICARE

## 2024-06-03 DIAGNOSIS — J18.9 PNEUMONIA OF RIGHT UPPER LOBE DUE TO INFECTIOUS ORGANISM: ICD-10-CM

## 2024-06-03 DIAGNOSIS — J44.9 COPD, SEVERE (HCC): Primary | ICD-10-CM

## 2024-06-03 DIAGNOSIS — J44.9 COPD, SEVERE (HCC): ICD-10-CM

## 2024-06-03 PROCEDURE — 94625 PHY/QHP OP PULM RHB W/O MNTR: CPT

## 2024-06-03 PROCEDURE — 71250 CT THORAX DX C-: CPT

## 2024-06-05 ENCOUNTER — CLINICAL SUPPORT (OUTPATIENT)
Dept: PULMONOLOGY | Facility: CLINIC | Age: 79
End: 2024-06-05
Payer: MEDICARE

## 2024-06-05 DIAGNOSIS — J44.9 COPD, SEVERE (HCC): Primary | ICD-10-CM

## 2024-06-05 PROCEDURE — 94625 PHY/QHP OP PULM RHB W/O MNTR: CPT

## 2024-06-05 NOTE — PROGRESS NOTES
PULMONARY REHABILITATION   ASSESSMENT AND INDIVIDUALIZED TREATMENT PLAN  30 DAY    Today's date: 2024  # of Exercise Sessions Completed: 5  Patient name: Etsela Javed     : 1945       MRN: [id number]  PCP: Addison Ha MD  Referring Physician: Delmer Robertson MD  Pulmonologist: Mariajose Worrell DO   Provider: Eyad  Clinician: Edgard Keller MS, CEP       ASSESSMENT    PULMONARY   Dx:   Encounter Diagnosis   Name Primary?    COPD, severe (HCC) Yes     Date of onset: 4/3/24  Description of Diagnosis: COPD, very severe - recently diagnosed, knew due to increased SOB at home   Other Pulmonary History: recent hospitalization due to pneumonia    COMMENTS: Looking at get qualified for [place]     PFT:    does have a PFT on file  FEV1/FVC ratio of 44% and an   FEV1 of 33% predicted  very severeobstruction.    Pulmonary Disease Risk Factors:  none    Medical History:   Past Medical History:   Diagnosis Date    COPD (chronic obstructive pulmonary disease) (HCC)     Emphysema of lung ([facility]) 2023    Pleural effusion 3/14/24    Pneumonia 3/13/24       Family History:  Family History   Problem Relation Age of Onset    COPD Father     COPD Brother        Allergies:   Bacteriophages, Bee venom, Ciprofloxacin, Sulfa antibiotics, Nitrofurantoin, Oxycodone-aspirin, Penicillins, Shellfish allergy - food allergy, Hornet venom, Mixed vespid venom, Other, Wasp venom, and Yellow jacket venom    Current Medications:   Current Outpatient Medications   Medication Sig Dispense Refill    albuterol (Ventolin HFA) 90 mcg/act inhaler Inhale 2 puffs every 6 (six) hours as needed for wheezing 18 g 5    Anoro Ellipta 62.5-25 MCG/ACT inhaler       Cholecalciferol 125 MCG (5000 UT) TABS Take 5,000 Units by mouth daily      Coenzyme Q10 200 MG capsule Take 200 mg by mouth daily      Combivent Respimat inhaler INHALE 1 PUFF BY MOUTH EVERY 4 HOURS AS DIRECTED AS NEEDED      EPINEPHrine (EPIPEN) 0.3 mg/0.3 mL SOAJ INJECT 1 PEN IN  "THE MUSCLE SINGLE DOSE AS NEEDED FOR ANAPHYLAXIS. REPEAT DOSE IN 5-10 MINUTES IF SYMPTOMS PERSIST. CALL 911 OR GO TO ER IF NEEDED       No current facility-administered medications for this visit.       Medication compliance: Yes   Comments: Pt reports to be compliant with medications    Cultural needs: none    Readiness to change: Preparation:  (Getting ready to change)       EXERCISE ASSESSMENT:      FUNCTIONAL STATUS:  Current Status:  Occupation: retired  Recreation: [place] [employer]   ADL’s:Capable of performing light ADLs only limited by Dyspnea, Weakness  [place]: No limitations  Home Exercise/Equipment: light walking - leisure  Other: none     Physical Limitations: herniated discs in lower back     Fall Risk: Low   Comments: Ambulates with a steady gait with no assist device    Initial Fitness Assessment: 6MWT:  Resting:    Resting:  BP: 104/70  HR: 86  SpO2: 96%  Dyspnea: 0, Exercise:  BP: 142/74  HR: 102  SpO2: 88%  Dyspnea: 6, METs:  2.2, and ECG Summary: NSR with occ PVCs       NUTRITION ASSESSMENT:    Height:   Ht Readings from Last 1 Encounters:   03/14/24 5' 4\" (1.626 m)       Weight control:    Starting weight: 93 lbs    Current weight: 93.8 lbs     Rate Your Plate Score: 65/81    Diabetes: N/A    Current Dietary Habits:  Minimal dairy   Rare meat eater   Has IBS - limites diet slightly     Drug/Alcohol Use: No      PSYCHOSOCIAL ASSESSMENT:    Depression screening:  PHQ-9 = 3    Interpretation:  1-4 = Minimal Depression  Last Assessed: 5/8/24    Anxiety screening:  THOMAS-7 = 1    Interpretation: 0-4  = Not anxious  Last Assessed: 5/8/24    Pt self-report of depression and anxiety:   Patient reports feelings of anxiety  Reports sufficient emotional support     Self-reported stress level:  5   Stressors:  house problems   Stress Management Tools: practice Relaxation Techniques, exercise, keep a positive mindset, spend time outside, and enjoy a hobby    Quality of Life Screen:  (Higher score " indicates disease impact on QOL)  [facility] COOP score: 25/40        Social Support: children, siblings, and friends  Community/Social Activities: none      Psychosocial Assessment as it relates to rehabilitation:   Patient denies issues with his/her family or home life that may affect their rehabilitation efforts.       OTHER CORE COMPONENT ASSESSMENT:    Tobacco Use: Brief counseling by cardiac rehab professional  Date: 5/8/24  Pt continues to smoke 0.25 ppd   Tobacco Intervention: Discussed barriers to quitting and steps to work toward a quit date    Hospitalizations in the past year: 1    Oxygen needs:   Rest:  room air  Exercise/physical activity:  room air  Sleep:   room air    Does Pt monitor home SpO2? yes   Average SpO2 at rest: 90%   Average SpO2 with ADLs/physical activity: 90%    Breathing Treatments:   Rescue Inhaler:  PRN  Maintenance Inhaler: Yes:  1 times per day  Nebulizer Treatments:  No  Patient practices breathing techniques at home:  Reviewed with patient on:  5/8/24      INDIVIDUALIZED TREATMENT PLAN    Patient will attend 23 monitored exercise sessions beginning 5/13/24.    See outlined plan of care below for specific patient goals in each component of care.      PATIENT SPECIFIC GOALS:     Exercise: (home exercise, ADLs)  reduced dependence on supplemental O2, attend pulmonary rehab regularly, decrease sitting time at home, start a walking program, begin a consistent exercise regimen , decreased rest needed with physical activity/exercise, increased muscular strength, increased energy, and increased stamina with ADLs  Improve exercise outside - walking    Nutrition: (wt control, diabetes management, dietary modifications)  increase water intake to reduce/thin mucus production eat less CO2 producing foods improve hydration weight gain   Psychosocial: (stress, emotional well being, social support)  maintain compliance with medical therapy  Find ways to get out of the house   Consider different  "relaxation methods instead of smoking    Core Component: (smoking, BP control, angina control, medication)  reduced dietary sodium <2000mg, follow fluid restriction as advised, medication compliance, and tobacco cessation in 12  weeks    SMART GOALS:  Exercise:   reduced score on CAT, improved 6MWT distance, reduced dyspnea during exercise, improved exercise tolerance based on peak METs tolerated in pulmonary rehab exercise session, SpO2 >88% during exercise, and reduced RPD at rest   Nutrition:   Improved Rate Your Plate score  >64, choose 1% or skim milk, use \"light\" tub margarine on bread, potatoes and vegetables, and choose healthy snacks such as fruit, pretzels, and low fat crackers   Psychosocial:   Reduce perceived stress to 1-3/10, improved [facility] QOL < 27, Physical F[facility] in [facility] Score < 3, Social Support in [facility] Score < 3, Daily Activity in [facility] Score < 3, Social Activities in [facility] Score < 3, Pain in [facility] Score < 3, Overall Health in [facility] Score < 3, Quality of Life in [facility] Score < 3 , Change in Health in [facility] Score < 3 , reduced incidence of restlessness and/or lethargy, reduced time feeling nervous or on edge, and take time to relax    Core Components:   consistent, controlled resting BP < 130/80, reduced number of cigarettes per day, and medication compliance      EXERCISE PLAN    Progress toward SMART and personal Exercise goals:  Goals met: attending rehab, slowly increasing strength and endurance, increasing functional METs, SpO2 >88% at rest and mostly with exertion, and use of PLB. Goals not met: no formal home exercise with rehab and no improvement noted yet at 30 days by patient     Group and Individual Education: pursed lipped breathing, diaphragmatic breathing, fall prevention while using nasal canula tubing, relaxation breathing, home exercise guidelines, benefits of exercise for pulmonary disease, RPE scale, RPD scale, O2 saturation monitoring, and " education class: Exercise For The Pulmonary Patient      Plan for next 30 days:    Titrate supplemental oxygen as needed to maintain SpO2>88% with exercise, learn to conserve energy with ADLs , practice diaphragmatic breathing, reduce time sitting at home, and increased strength of respiratory muscles    Current Aerobic Exercise Prescription:      Frequency: 2 days/week *Supplement with home exercise 2+ days/wk as tolerated        Minutes: 26-31         METS: 1.8-3.5              SpO2: 86-92% on RA               RPD: 2-6         HR:  103-116   RPE: 4-6         Modalities: Treadmill, UBE, NuStep, and Recumbent bike     Exercise workloads will be progressed gradually as tolerated, within limits of patient's ability, and according to the patient's response to the exercise program.    Aerobic Exercise Prescription - 30 day goal:   Frequency: 12 days/week *Supplement with home exercise 2+ days/wk as tolerated        Minutes: 30-40         METS: 3.6-4.0              SpO2: >88% on RA               RPD: 4-6          HR: RHR +30-40bpm   RPE: 4-6         Modalities: Treadmill, UBE, NuStep, and Recumbent bike     Strength training:  Will be added following 2-3 weeks of monitored exercise sessions   Modalities: Lateral Raise, Arm Curl, Sit to Stands, Upright Rows, and Front Raises    Supplemental Oxygen Needs with Exercise:  room air.    Home Exercise: none    The patient was counseled on exercise guidelines to achieve a minimum of 150 mins/wk of moderate intensity (RPE 4-6) exercise and encouraged to add 1-2 days of exercise on opposite days of cardiac rehab as tolerated.       NUTRITION PLAN    Patient's progress toward SMART and personal Nutrition goals:   Goals met: No concerns with weight. Pt continues to have same diet with no changes.     Group and Individual Education:   heart healthy eating principles  maintaining hydration    Plan for next 30 days:   group class: Reading Food Labels, group Class: Heart Healthy Eating,  choose healthy meals while dining out, use light or fat-free salad dressings and mayonnaise, eat healthy snacks like fruit, pretzels, and low fat crackers, choose desserts such as fruit, francis food cake, low-fat or fat-free sweets, never/rarely add salt to food when cooking or at the table, rarely/never eat salty snacks, and choose low sugar desserts and sweets    Measurable goals were based Rate Your Plate Dietary Self-Assessment. These are the areas in which the patient could score higher on the assessment. Goals include recommendations for a heart healthy diet based on American Heart Association.      PSYCHOSOCIAL PLAN    Patient's progress toward SMART and personal Psychosocial goals:   Goals met: Acknowledges anxiety problems and managing stress well     Group and Individual Education: stress management techniques, tools to manage fear/anxiety related to becoming SOB, and benefits of mental health counseling    Plan for next 30 days:   Class: Stress and Your Health, Class: Relaxation, Class:  Stress and Pulmonary Disease, Refer to behavioral health/counseling, Practice relaxation techniques, Exercise, Learn how to relax and slow down, Join a support group , and Meet new people    Information to utilize [employer] was provided as well as contact information for counseling through  Behavioral Health and group psychotherapy groups available.      OTHER CORE COMPONENTS PLAN    Blood pressure:    Restin//72   Exercise: 104//72   Recovery: 90//72    Pt will be encouraged to monitor home BP if advised by cardiologist.    Progress toward SMART and personal Core Component goals:    Goals met: blood pressures within normal limits at rest and exercise and compliant with maintenance inhaler. Goals not met: continues to smoke    Group and Individual Education:  effects of nicotine and tobacco, identifying smoking triggers, how to set a smoking cessation plan, and smoking cessation tools and  methods    Plan for next 30 days:   group class: Understanding Heart Disease, group class: Common Heart Medications, medication compliance, set target quit date for smoking, reduce number of cigarettes per day, avoid places with second hand smoke, engage in regular exercise, group class: Pulmonary Anatomy and Physiology, group class:  Pulmonary Medications, and refer to [facility]

## 2024-06-10 ENCOUNTER — APPOINTMENT (OUTPATIENT)
Dept: PULMONOLOGY | Facility: CLINIC | Age: 79
End: 2024-06-10
Payer: MEDICARE

## 2024-06-12 ENCOUNTER — CLINICAL SUPPORT (OUTPATIENT)
Dept: PULMONOLOGY | Facility: CLINIC | Age: 79
End: 2024-06-12
Payer: MEDICARE

## 2024-06-12 DIAGNOSIS — J44.9 COPD, SEVERE (HCC): Primary | ICD-10-CM

## 2024-06-12 PROCEDURE — 94625 PHY/QHP OP PULM RHB W/O MNTR: CPT

## 2024-06-14 ENCOUNTER — DOCUMENTATION (OUTPATIENT)
Dept: PULMONOLOGY | Facility: CLINIC | Age: 79
End: 2024-06-14

## 2024-06-17 ENCOUNTER — CLINICAL SUPPORT (OUTPATIENT)
Dept: PULMONOLOGY | Facility: CLINIC | Age: 79
End: 2024-06-17
Payer: MEDICARE

## 2024-06-17 DIAGNOSIS — J44.9 COPD, SEVERE (HCC): Primary | ICD-10-CM

## 2024-06-17 PROCEDURE — 94625 PHY/QHP OP PULM RHB W/O MNTR: CPT

## 2024-06-18 DIAGNOSIS — J44.9 COPD, SEVERE (HCC): Primary | ICD-10-CM

## 2024-06-19 ENCOUNTER — CLINICAL SUPPORT (OUTPATIENT)
Dept: PULMONOLOGY | Facility: CLINIC | Age: 79
End: 2024-06-19
Payer: MEDICARE

## 2024-06-19 DIAGNOSIS — J44.9 COPD, SEVERE (HCC): Primary | ICD-10-CM

## 2024-06-19 PROCEDURE — 94625 PHY/QHP OP PULM RHB W/O MNTR: CPT

## 2024-06-24 ENCOUNTER — CLINICAL SUPPORT (OUTPATIENT)
Dept: PULMONOLOGY | Facility: CLINIC | Age: 79
End: 2024-06-24
Payer: MEDICARE

## 2024-06-24 DIAGNOSIS — J44.9 COPD, SEVERE (HCC): Primary | ICD-10-CM

## 2024-06-24 PROCEDURE — 94625 PHY/QHP OP PULM RHB W/O MNTR: CPT

## 2024-06-26 ENCOUNTER — CLINICAL SUPPORT (OUTPATIENT)
Dept: PULMONOLOGY | Facility: CLINIC | Age: 79
End: 2024-06-26
Payer: MEDICARE

## 2024-06-26 DIAGNOSIS — J44.9 COPD, SEVERE (HCC): Primary | ICD-10-CM

## 2024-06-26 PROCEDURE — 94625 PHY/QHP OP PULM RHB W/O MNTR: CPT

## 2024-07-01 ENCOUNTER — CLINICAL SUPPORT (OUTPATIENT)
Dept: PULMONOLOGY | Facility: CLINIC | Age: 79
End: 2024-07-01
Payer: MEDICARE

## 2024-07-01 DIAGNOSIS — J44.9 COPD, SEVERE (HCC): Primary | ICD-10-CM

## 2024-07-01 PROCEDURE — 94625 PHY/QHP OP PULM RHB W/O MNTR: CPT

## 2024-07-03 ENCOUNTER — CLINICAL SUPPORT (OUTPATIENT)
Dept: PULMONOLOGY | Facility: CLINIC | Age: 79
End: 2024-07-03
Payer: MEDICARE

## 2024-07-03 DIAGNOSIS — J44.9 COPD, SEVERE (HCC): Primary | ICD-10-CM

## 2024-07-03 PROCEDURE — 94625 PHY/QHP OP PULM RHB W/O MNTR: CPT

## 2024-07-03 NOTE — PROGRESS NOTES
PULMONARY REHABILITATION   ASSESSMENT AND INDIVIDUALIZED TREATMENT PLAN  60 DAY    Today's date: July 3, 2024  # of Exercise Sessions Completed: 11  Patient name: Estela Javed     : 1945       MRN: [id number]  PCP: Addison Ha MD  Referring Physician: Delmer Robertson MD  Pulmonologist: Mariajose Worrell DO   Provider: Eyad  Clinician: Edgard Keller MS, CEP       ASSESSMENT    PULMONARY   Dx:   Encounter Diagnosis   Name Primary?    COPD, severe (HCC) Yes     Date of onset: 4/3/24  Description of Diagnosis: COPD, very severe - recently diagnosed, knew due to increased SOB at home   Other Pulmonary History: recent hospitalization due to pneumonia    COMMENTS: Looking at get qualified for [place]     PFT:    does have a PFT on file  FEV1/FVC ratio of 44% and an   FEV1 of 33% predicted  very severeobstruction.    Pulmonary Disease Risk Factors:  none    Medical History:   Past Medical History:   Diagnosis Date    COPD (chronic obstructive pulmonary disease) (HCC)     Emphysema of lung ([facility]) 2023    Pleural effusion 3/14/24    Pneumonia 3/13/24       Family History:  Family History   Problem Relation Age of Onset    COPD Father     COPD Brother        Allergies:   Bacteriophages, Bee venom, Ciprofloxacin, Sulfa antibiotics, Nitrofurantoin, Oxycodone-aspirin, Penicillins, Shellfish allergy - food allergy, Hornet venom, Mixed vespid venom, Other, Wasp venom, and Yellow jacket venom    Current Medications:   Current Outpatient Medications   Medication Sig Dispense Refill    albuterol (Ventolin HFA) 90 mcg/act inhaler Inhale 2 puffs every 6 (six) hours as needed for wheezing 18 g 5    Anoro Ellipta 62.5-25 MCG/ACT inhaler       Cholecalciferol 125 MCG (5000 UT) TABS Take 5,000 Units by mouth daily      Coenzyme Q10 200 MG capsule Take 200 mg by mouth daily      Combivent Respimat inhaler INHALE 1 PUFF BY MOUTH EVERY 4 HOURS AS DIRECTED AS NEEDED      EPINEPHrine (EPIPEN) 0.3 mg/0.3 mL SOAJ INJECT 1 PEN IN  "THE MUSCLE SINGLE DOSE AS NEEDED FOR ANAPHYLAXIS. REPEAT DOSE IN 5-10 MINUTES IF SYMPTOMS PERSIST. CALL 911 OR GO TO ER IF NEEDED       No current facility-administered medications for this visit.       Medication compliance: Yes   Comments: Pt reports to be compliant with medications    Cultural needs: none    Readiness to change: Preparation:  (Getting ready to change)       EXERCISE ASSESSMENT:      FUNCTIONAL STATUS:  Current Status:  Occupation: retired  Recreation: [employer]   ADL’s:Capable of performing light ADLs only limited by Dyspnea, Weakness  Nicholas: No limitations  Home Exercise/Equipment: light walking - leisure  Other: none     Physical Limitations: herniated discs in lower back     Fall Risk: Low   Comments: Ambulates with a steady gait with no assist device    Initial Fitness Assessment: 6MWT:  Resting:    Resting:  BP: 104/70  HR: 86  SpO2: 96%  Dyspnea: 0, Exercise:  BP: 142/74  HR: 102  SpO2: 88%  Dyspnea: 6, METs:  2.2, and ECG Summary: NSR with occ PVCs       NUTRITION ASSESSMENT:    Height:   Ht Readings from Last 1 Encounters:   03/14/24 5' 4\" (1.626 m)       Weight control:    Starting weight: 93 lbs    Current weight: 96.8 lbs     Rate Your Plate Score: 65/81    Diabetes: N/A    Current Dietary Habits:  Minimal dairy   Rare meat eater   Has IBS - limites diet slightly     Drug/Alcohol Use: No      PSYCHOSOCIAL ASSESSMENT:    Depression screening:  PHQ-9 = 3    Interpretation:  1-4 = Minimal Depression  Last Assessed: 5/8/24    Anxiety screening:  THOMAS-7 = 1    Interpretation: 0-4  = Not anxious  Last Assessed: 5/8/24    Pt self-report of depression and anxiety:   Patient reports feelings of anxiety  Reports sufficient emotional support     Self-reported stress level:  5   Stressors:  house problems   Stress Management Tools: practice Relaxation Techniques, exercise, keep a positive mindset, spend time outside, and enjoy a hobby    Quality of Life Screen:  (Higher score " indicates disease impact on QOL)  [facility] COOP score: 25/40        Social Support: children, siblings, and friends  Community/Social Activities: none      Psychosocial Assessment as it relates to rehabilitation:   Patient denies issues with his/her family or home life that may affect their rehabilitation efforts.       OTHER CORE COMPONENT ASSESSMENT:    Tobacco Use: Brief counseling by cardiac rehab professional  Date: 5/8/24  Pt continues to smoke 0.25 ppd   Tobacco Intervention: Discussed barriers to quitting and steps to work toward a quit date    Hospitalizations in the past year: 1    Oxygen needs:   Rest:  room air  Exercise/physical activity:  room air  Sleep:   room air    Does Pt monitor home SpO2? yes   Average SpO2 at rest: 90%   Average SpO2 with ADLs/physical activity: 90%    Breathing Treatments:   Rescue Inhaler:  PRN  Maintenance Inhaler: Yes:  1 times per day  Nebulizer Treatments:  No  Patient practices breathing techniques at home:  Reviewed with patient on:  5/8/24      INDIVIDUALIZED TREATMENT PLAN    Patient will attend 23 monitored exercise sessions beginning 5/13/24.    See outlined plan of care below for specific patient goals in each component of care.      PATIENT SPECIFIC GOALS:     Exercise: (home exercise, ADLs)  reduced dependence on supplemental O2, attend pulmonary rehab regularly, decrease sitting time at home, start a walking program, begin a consistent exercise regimen , decreased rest needed with physical activity/exercise, increased muscular strength, increased energy, and increased stamina with ADLs  Improve exercise outside - walking    Nutrition: (wt control, diabetes management, dietary modifications)  increase water intake to reduce/thin mucus production eat less CO2 producing foods improve hydration weight gain   Psychosocial: (stress, emotional well being, social support)  maintain compliance with medical therapy  Find ways to get out of the house   Consider different  "relaxation methods instead of smoking    Core Component: (smoking, BP control, angina control, medication)  reduced dietary sodium <2000mg, follow fluid restriction as advised, medication compliance, and tobacco cessation in 12  weeks    SMART GOALS:  Exercise:   reduced score on CAT, improved 6MWT distance, reduced dyspnea during exercise, improved exercise tolerance based on peak METs tolerated in pulmonary rehab exercise session, SpO2 >88% during exercise, and reduced RPD at rest   Nutrition:   Improved Rate Your Plate score  >64, choose 1% or skim milk, use \"light\" tub margarine on bread, potatoes and vegetables, and choose healthy snacks such as fruit, pretzels, and low fat crackers   Psychosocial:   Reduce perceived stress to 1-3/10, improved [facility] QOL < 27, Physical Fitness in [facility] Score < 3, Social Support in [facility] Score < 3, Daily Activity in [facility] Score < 3, Social Activities in [facility] Score < 3, Pain in [facility] Score < 3, Overall Health in [facility] Score < 3, Quality of Life in Count includes the [facility] Score < 3 , Change in Health in [facility] Score < 3 , reduced incidence of restlessness and/or lethargy, reduced time feeling nervous or on edge, and take time to relax    Core Components:   consistent, controlled resting BP < 130/80, reduced number of cigarettes per day, and medication compliance      EXERCISE PLAN    Progress toward SMART and personal Exercise goals:  Goals met: attending rehab, slowly increasing strength and endurance, SpO2 >88% at rest and mostly with exertion, and use of PLB. Goals not met: no formal home exercise with rehab and no improvement noted yet at 30 days by patient     Group and Individual Education: pursed lipped breathing, diaphragmatic breathing, fall prevention while using nasal canula tubing, relaxation breathing, home exercise guidelines, benefits of exercise for pulmonary disease, RPE scale, RPD scale, O2 saturation monitoring, and education class: Exercise For " The Pulmonary Patient    Plan for next 30 days:    Titrate supplemental oxygen as needed to maintain SpO2>88% with exercise, learn to conserve energy with ADLs , practice diaphragmatic breathing, reduce time sitting at home, and increased strength of respiratory muscles    Current Aerobic Exercise Prescription:      Frequency: 2 days/week *Supplement with home exercise 2+ days/wk as tolerated        Minutes: 35-45         METS: 1.8-3.5              SpO2: 86-92% on RA               RPD: 2-4         HR:     RPE: 4-6         Modalities: Treadmill, UBE, NuStep, and Recumbent bike     Exercise workloads will be progressed gradually as tolerated, within limits of patient's ability, and according to the patient's response to the exercise program.    Aerobic Exercise Prescription - 30 day goal:   Frequency: 12 days/week *Supplement with home exercise 2+ days/wk as tolerated        Minutes: 45-50         METS: 3.6-4.0              SpO2: >88% on RA               RPD: 4-6          HR: RHR +30-40bpm   RPE: 4-6         Modalities: Treadmill, UBE, NuStep, and Recumbent bike     Strength trainin-3 days / week  12-15 repetitions  1-2 sets per modality    Modalities: Lateral Raise, Arm Curl, Sit to Stands, Upright Rows, and Front Raises    Supplemental Oxygen Needs with Exercise:  room air.    Home Exercise: none    The patient was counseled on exercise guidelines to achieve a minimum of 150 mins/wk of moderate intensity (RPE 4-6) exercise and encouraged to add 1-2 days of exercise on opposite days of cardiac rehab as tolerated.       NUTRITION PLAN    Patient's progress toward SMART and personal Nutrition goals:   Goals met: No concerns with weight. Pt continues to have same diet with no changes.     Group and Individual Education:   heart healthy eating principles  maintaining hydration  group class:  Label Reading    Plan for next 30 days:   group class: Reading Food Labels, group Class: Heart Healthy Eating, choose  healthy meals while dining out, use light or fat-free salad dressings and mayonnaise, eat healthy snacks like fruit, pretzels, and low fat crackers, choose desserts such as fruit, francis food cake, low-fat or fat-free sweets, never/rarely add salt to food when cooking or at the table, rarely/never eat salty snacks, and choose low sugar desserts and sweets    Measurable goals were based Rate Your Plate Dietary Self-Assessment. These are the areas in which the patient could score higher on the assessment. Goals include recommendations for a heart healthy diet based on American Heart Association.      PSYCHOSOCIAL PLAN    Patient's progress toward SMART and personal Psychosocial goals:   Goals met: Acknowledges anxiety problems and managing stress well     Group and Individual Education: stress management techniques, tools to manage fear/anxiety related to becoming SOB, and benefits of mental health counseling    Plan for next 30 days:   Class: Stress and Your Health, Class: Relaxation, Class:  Stress and Pulmonary Disease, Refer to behavioral health/counseling, Practice relaxation techniques, Exercise, Learn how to relax and slow down, Join a support group , and Meet new people    Information to utilize [employer] was provided as well as contact information for counseling through  Behavioral Health and group psychotherapy groups available.      OTHER CORE COMPONENTS PLAN    Blood pressure:    Restin//68   Exercise: 90//68   Recovery: 102//62    Pt will be encouraged to monitor home BP if advised by cardiologist.    Progress toward SMART and personal Core Component goals:    Goals met: blood pressures within normal limits at rest and exercise and compliant with maintenance inhaler. Goals not met: continues to smoke    Group and Individual Education:  effects of nicotine and tobacco, identifying smoking triggers, how to set a smoking cessation plan, smoking cessation tools and methods, and  group class:  Common Heart Medications    Plan for next 30 days:   group class: Understanding Heart Disease, medication compliance, set target quit date for smoking, reduce number of cigarettes per day, avoid places with second hand smoke, engage in regular exercise, group class: Pulmonary Anatomy and Physiology, group class:  Pulmonary Medications, and refer to [facility]essation [facility]

## 2024-07-08 ENCOUNTER — CLINICAL SUPPORT (OUTPATIENT)
Dept: PULMONOLOGY | Facility: CLINIC | Age: 79
End: 2024-07-08
Payer: MEDICARE

## 2024-07-08 DIAGNOSIS — J44.9 COPD, SEVERE (HCC): Primary | ICD-10-CM

## 2024-07-08 PROCEDURE — 94625 PHY/QHP OP PULM RHB W/O MNTR: CPT

## 2024-07-10 ENCOUNTER — CLINICAL SUPPORT (OUTPATIENT)
Dept: PULMONOLOGY | Facility: CLINIC | Age: 79
End: 2024-07-10
Payer: MEDICARE

## 2024-07-10 DIAGNOSIS — J44.9 COPD, SEVERE (HCC): Primary | ICD-10-CM

## 2024-07-10 PROCEDURE — 94625 PHY/QHP OP PULM RHB W/O MNTR: CPT

## 2024-07-15 ENCOUNTER — CLINICAL SUPPORT (OUTPATIENT)
Dept: PULMONOLOGY | Facility: CLINIC | Age: 79
End: 2024-07-15
Payer: MEDICARE

## 2024-07-15 DIAGNOSIS — J44.9 COPD, SEVERE (HCC): Primary | ICD-10-CM

## 2024-07-15 PROCEDURE — 94625 PHY/QHP OP PULM RHB W/O MNTR: CPT

## 2024-07-17 ENCOUNTER — APPOINTMENT (OUTPATIENT)
Dept: PULMONOLOGY | Facility: CLINIC | Age: 79
End: 2024-07-17
Payer: MEDICARE

## 2024-07-22 ENCOUNTER — CLINICAL SUPPORT (OUTPATIENT)
Dept: PULMONOLOGY | Facility: CLINIC | Age: 79
End: 2024-07-22
Payer: MEDICARE

## 2024-07-22 DIAGNOSIS — J44.9 COPD, SEVERE (HCC): Primary | ICD-10-CM

## 2024-07-22 PROCEDURE — 94625 PHY/QHP OP PULM RHB W/O MNTR: CPT

## 2024-07-24 ENCOUNTER — CLINICAL SUPPORT (OUTPATIENT)
Dept: PULMONOLOGY | Facility: CLINIC | Age: 79
End: 2024-07-24
Payer: MEDICARE

## 2024-07-24 DIAGNOSIS — J44.9 COPD, SEVERE (HCC): Primary | ICD-10-CM

## 2024-07-24 PROCEDURE — 94625 PHY/QHP OP PULM RHB W/O MNTR: CPT

## 2024-07-29 ENCOUNTER — CLINICAL SUPPORT (OUTPATIENT)
Dept: PULMONOLOGY | Facility: CLINIC | Age: 79
End: 2024-07-29
Payer: MEDICARE

## 2024-07-29 DIAGNOSIS — J44.9 COPD, SEVERE (HCC): Primary | ICD-10-CM

## 2024-07-29 PROCEDURE — 94625 PHY/QHP OP PULM RHB W/O MNTR: CPT

## 2024-07-31 ENCOUNTER — CLINICAL SUPPORT (OUTPATIENT)
Dept: PULMONOLOGY | Facility: CLINIC | Age: 79
End: 2024-07-31
Payer: MEDICARE

## 2024-07-31 DIAGNOSIS — J44.9 COPD, SEVERE (HCC): Primary | ICD-10-CM

## 2024-07-31 PROCEDURE — 94625 PHY/QHP OP PULM RHB W/O MNTR: CPT

## 2024-07-31 NOTE — PROGRESS NOTES
PULMONARY REHABILITATION   ASSESSMENT AND INDIVIDUALIZED TREATMENT PLAN  90 DAY    Today's date: 2024  # of Exercise Sessions Completed: 19  Patient name: Estela Javed     : 1945       MRN: [id number]  PCP: Addison Ha MD  Referring Physician: Delmer Robertson MD  Pulmonologist: Mariajose Worrell DO   Provider: Eyad  Clinician: Edgard Keller MS, CEP       ASSESSMENT    PULMONARY   Dx:   Encounter Diagnosis   Name Primary?    COPD, severe (HCC) Yes     Date of onset: 4/3/24  Description of Diagnosis: COPD, very severe - recently diagnosed, knew due to increased SOB at home   Other Pulmonary History: recent hospitalization due to pneumonia    COMMENTS: Looking at get qualified for [place]     PFT:    does have a PFT on file  FEV1/FVC ratio of 44% and an   FEV1 of 33% predicted  very severeobstruction.    Pulmonary Disease Risk Factors:  none    Medical History:   Past Medical History:   Diagnosis Date    COPD (chronic obstructive pulmonary disease) (HCC)     Emphysema of lung ([facility]) 2023    Pleural effusion 3/14/24    Pneumonia 3/13/24       Family History:  Family History   Problem Relation Age of Onset    COPD Father     COPD Brother        Allergies:   Bacteriophages, Bee venom, Ciprofloxacin, Sulfa antibiotics, Nitrofurantoin, Oxycodone-aspirin, Penicillins, Shellfish allergy - food allergy, Hornet venom, Mixed vespid venom, Other, Wasp venom, and Yellow jacket venom    Current Medications:   Current Outpatient Medications   Medication Sig Dispense Refill    albuterol (Ventolin HFA) 90 mcg/act inhaler Inhale 2 puffs every 6 (six) hours as needed for wheezing 18 g 5    Anoro Ellipta 62.5-25 MCG/ACT inhaler       ascorbic acid (VITAMIN C) 1000 MG tablet Take 1,000 mg by mouth daily      Cholecalciferol 125 MCG (5000 UT) TABS Take 5,000 Units by mouth daily      Coenzyme Q10 200 MG capsule Take 200 mg by mouth daily      Combivent Respimat inhaler INHALE 1 PUFF BY MOUTH EVERY 4 HOURS AS  "DIRECTED AS NEEDED      EPINEPHrine (EPIPEN) 0.3 mg/0.3 mL SOAJ INJECT 1 PEN IN THE MUSCLE SINGLE DOSE AS NEEDED FOR ANAPHYLAXIS. REPEAT DOSE IN 5-10 MINUTES IF SYMPTOMS PERSIST. CALL 911 OR GO TO ER IF NEEDED       No current facility-administered medications for this visit.       Medication compliance: Yes   Comments: Pt reports to be compliant with medications    Cultural needs: none    Readiness to change: Preparation:  (Getting ready to change)       EXERCISE ASSESSMENT:      FUNCTIONAL STATUS:  Current Status:  Occupation: retired  Recreation: [employer]   ADL’s:Capable of performing light ADLs only limited by Dyspnea, Weakness  [place]: No limitations  Home Exercise/Equipment: light walking - leisure  Other: none     Physical Limitations: herniated discs in lower back     Fall Risk: Low   Comments: Ambulates with a steady gait with no assist device    Initial Fitness Assessment: 6MWT:  Resting:    Resting:  BP: 104/70  HR: 86  SpO2: 96%  Dyspnea: 0, Exercise:  BP: 142/74  HR: 102  SpO2: 88%  Dyspnea: 6, METs:  2.2, and ECG Summary: NSR with occ PVCs       NUTRITION ASSESSMENT:    Height:   Ht Readings from Last 1 Encounters:   03/14/24 5' 4\" (1.626 m)       Weight control:    Starting weight: 93 lbs    Current weight: 94.8 lbs     Rate Your Plate Score: 65/81    Diabetes: N/A    Current Dietary Habits:  Minimal dairy   Rare meat eater   Has IBS - limites diet slightly     Drug/Alcohol Use: No      PSYCHOSOCIAL ASSESSMENT:    Depression screening:  PHQ-9 = 3    Interpretation:  1-4 = Minimal Depression  Last Assessed: 5/8/24    Anxiety screening:  THOMAS-7 = 1    Interpretation: 0-4  = Not anxious  Last Assessed: 5/8/24    Pt self-report of depression and anxiety:   Patient reports feelings of anxiety  Reports sufficient emotional support     Self-reported stress level:  5   Stressors:  house problems   Stress Management Tools: practice Relaxation Techniques, exercise, keep a positive mindset, " spend time outside, and enjoy a hobby    Quality of Life Screen:  (Higher score indicates disease impact on QOL)  [facility] COOP score: 25/40        Social Support: children, siblings, and friends  Community/Social Activities: none      Psychosocial Assessment as it relates to rehabilitation:   Patient denies issues with his/her family or home life that may affect their rehabilitation efforts.       OTHER CORE COMPONENT ASSESSMENT:    Tobacco Use: Brief counseling by cardiac rehab professional  Date: 5/8/24  Pt continues to smoke 0.25 ppd   Tobacco Intervention: Discussed barriers to quitting and steps to work toward a quit date    Hospitalizations in the past year: 1    Oxygen needs:   Rest:  room air  Exercise/physical activity:  room air  Sleep:   room air    Does Pt monitor home SpO2? yes   Average SpO2 at rest: 90%   Average SpO2 with ADLs/physical activity: 90%    Breathing Treatments:   Rescue Inhaler:  PRN  Maintenance Inhaler: Yes:  1 times per day  Nebulizer Treatments:  No  Patient practices breathing techniques at home:  Reviewed with patient on:  5/8/24      INDIVIDUALIZED TREATMENT PLAN    Patient will attend 23 monitored exercise sessions beginning 5/13/24.    See outlined plan of care below for specific patient goals in each component of care.      PATIENT SPECIFIC GOALS:     Exercise: (home exercise, ADLs)  reduced dependence on supplemental O2, attend pulmonary rehab regularly, decrease sitting time at home, start a walking program, begin a consistent exercise regimen , decreased rest needed with physical activity/exercise, increased muscular strength, increased energy, and increased stamina with ADLs  Improve exercise outside - walking    Nutrition: (wt control, diabetes management, dietary modifications)  increase water intake to reduce/thin mucus production eat less CO2 producing foods improve hydration weight gain   Psychosocial: (stress, emotional well being, social support)  maintain  "compliance with medical therapy  Find ways to get out of the house   Consider different relaxation methods instead of smoking    Core Component: (smoking, BP control, angina control, medication)  reduced dietary sodium <2000mg, follow fluid restriction as advised, medication compliance, and tobacco cessation in 12  weeks    SMART GOALS:  Exercise:   reduced score on CAT, improved 6MWT distance, reduced dyspnea during exercise, improved exercise tolerance based on peak METs tolerated in pulmonary rehab exercise session, SpO2 >88% during exercise, and reduced RPD at rest   Nutrition:   Improved Rate Your Plate score  >64, choose 1% or skim milk, use \"light\" tub margarine on bread, potatoes and vegetables, and choose healthy snacks such as fruit, pretzels, and low fat crackers   Psychosocial:   Reduce perceived stress to 1-3/10, improved [facility] QOL < 27, Physical Fitness in [facility] Score < 3, Social Support in [facility] Score < 3, Daily Activity in [facility] Score < 3, Social Activities in [facility] Score < 3, Pain in [facility] Score < 3, Overall Health in [facility] Score < 3, Quality of Life in [facility] Score < 3 , Change in Health in [facility] Score < 3 , reduced incidence of restlessness and/or lethargy, reduced time feeling nervous or on edge, and take time to relax    Core Components:   consistent, controlled resting BP < 130/80, reduced number of cigarettes per day, and medication compliance      EXERCISE PLAN    Progress toward SMART and personal Exercise goals:  Progressing: attending rehab, slowly increasing strength and endurance, SpO2 >88% at rest and mostly with exertion, and use of PLB. Goals not met: no formal home exercise with rehab and continues to report SALINAS      Group and Individual Education: pursed lipped breathing, diaphragmatic breathing, fall prevention while using nasal canula tubing, relaxation breathing, home exercise guidelines, benefits of exercise for pulmonary disease, RPE scale, RPD " scale, O2 saturation monitoring, and education class: Exercise For The Pulmonary Patient    Plan for next 30 days:    Titrate supplemental oxygen as needed to maintain SpO2>88% with exercise, learn to conserve energy with ADLs , practice diaphragmatic breathing, reduce time sitting at home, and increased strength of respiratory muscles    Current Aerobic Exercise Prescription:      Frequency: 2 days/week *Supplement with home exercise 2+ days/wk as tolerated        Minutes: 40-50         METS: 1.8-3.5              SpO2: 92-94% on RA               RPD: 3-4         HR:     RPE: 4-6         Modalities: Treadmill, UBE, NuStep, and Recumbent bike     Exercise workloads will be progressed gradually as tolerated, within limits of patient's ability, and according to the patient's response to the exercise program.    Aerobic Exercise Prescription - 30 day goal:   Frequency: 12 days/week *Supplement with home exercise 2+ days/wk as tolerated        Minutes: 45-60          METS: 3.6-4.0              SpO2: >88% on RA               RPD: 4-6          HR: RHR +30-40bpm   RPE: 4-6         Modalities: Treadmill, UBE, NuStep, and Recumbent bike     Strength trainin-3 days / week  12-15 repetitions  1-2 sets per modality    Modalities: Lateral Raise, Arm Curl, Sit to Stands, Upright Rows, and Front Raises    Supplemental Oxygen Needs with Exercise:  room air.    Home Exercise: none    The patient was counseled on exercise guidelines to achieve a minimum of 150 mins/wk of moderate intensity (RPE 4-6) exercise and encouraged to add 1-2 days of exercise on opposite days of cardiac rehab as tolerated.       NUTRITION PLAN    Patient's progress toward SMART and personal Nutrition goals:   Progressing: No concerns with weight. Pt continues to have same diet with no changes.     Group and Individual Education:   heart healthy eating principles  maintaining hydration  group class:  Label Reading    Plan for next 30 days:   group  class: Reading Food Labels, group Class: Heart Healthy Eating, choose healthy meals while dining out, use light or fat-free salad dressings and mayonnaise, eat healthy snacks like fruit, pretzels, and low fat crackers, choose desserts such as fruit, francis food cake, low-fat or fat-free sweets, never/rarely add salt to food when cooking or at the table, rarely/never eat salty snacks, and choose low sugar desserts and sweets    Measurable goals were based Rate Your Plate Dietary Self-Assessment. These are the areas in which the patient could score higher on the assessment. Goals include recommendations for a heart healthy diet based on American Heart Association.      PSYCHOSOCIAL PLAN    Patient's progress toward SMART and personal Psychosocial goals:   Progressing: Acknowledges anxiety problems and managing stress well     Group and Individual Education: stress management techniques, tools to manage fear/anxiety related to becoming SOB, benefits of mental health counseling, and class:  Stress and Pulmonary Disease    Plan for next 30 days:   Refer to behavioral health/counseling, Practice relaxation techniques, Exercise, Learn how to relax and slow down, Join a support group , and Meet new people    Information to utilize [employer] was provided as well as contact information for counseling through  Behavioral Health and group psychotherapy groups available.      OTHER CORE COMPONENTS PLAN    Blood pressure:    Restin//80   Exercise: 94//78   Recovery: 90//74    Pt will be encouraged to monitor home BP if advised by cardiologist.    Progress toward SMART and personal Core Component goals:    Progressing: blood pressures within normal limits at rest and exercise and compliant with maintenance inhaler. Not Progressing: continues to smoke    Group and Individual Education:  effects of nicotine and tobacco, identifying smoking triggers, how to set a smoking cessation plan, smoking cessation  tools and methods, and Education: Medication Management and Control Breathing    Plan for next 30 days:   group class: Understanding Heart Disease, medication compliance, set target quit date for smoking, reduce number of cigarettes per day, avoid places with second hand smoke, engage in regular exercise, group class: Pulmonary Anatomy and Physiology, and refer to [facility] Cessation [facility]

## 2024-08-05 ENCOUNTER — APPOINTMENT (OUTPATIENT)
Dept: PULMONOLOGY | Facility: CLINIC | Age: 79
End: 2024-08-05
Payer: MEDICARE

## 2024-08-07 ENCOUNTER — CLINICAL SUPPORT (OUTPATIENT)
Dept: PULMONOLOGY | Facility: CLINIC | Age: 79
End: 2024-08-07
Payer: MEDICARE

## 2024-08-07 ENCOUNTER — OFFICE VISIT (OUTPATIENT)
Dept: PULMONOLOGY | Facility: CLINIC | Age: 79
End: 2024-08-07
Payer: MEDICARE

## 2024-08-07 VITALS
HEIGHT: 64 IN | DIASTOLIC BLOOD PRESSURE: 70 MMHG | HEART RATE: 80 BPM | TEMPERATURE: 97 F | SYSTOLIC BLOOD PRESSURE: 116 MMHG | OXYGEN SATURATION: 94 % | BODY MASS INDEX: 16.05 KG/M2 | WEIGHT: 94 LBS

## 2024-08-07 DIAGNOSIS — F17.210 CIGARETTE NICOTINE DEPENDENCE WITHOUT COMPLICATION: ICD-10-CM

## 2024-08-07 DIAGNOSIS — J44.9 COPD, SEVERE (HCC): Primary | ICD-10-CM

## 2024-08-07 PROCEDURE — 94625 PHY/QHP OP PULM RHB W/O MNTR: CPT

## 2024-08-07 PROCEDURE — 99214 OFFICE O/P EST MOD 30 MIN: CPT | Performed by: INTERNAL MEDICINE

## 2024-08-07 NOTE — PROGRESS NOTES
Pulmonary Follow Up Note   Estela Javed 79 y.o. female MRN: [id number]  8/7/2024      Assessment/Plan:     COPD, severe (HCC)  She finds Anoro Ellipta to be beneficial, but is causing some constipation.  We discussed the possibility of changing inhalers, however she would like to continue with current regimen and make no changes.  We again discussed the risks and benefits of bronchoscopic lung volume reduction.  She understands that even if she were able to quit smoking, she would need additional testing to determine her candidacy.  She is very concerned about the possible risks, which is certainly understandable.  We agreed that we will focus our efforts for now on smoking cessation with plans to update PFTs once she achieves that goal.  Given the distribution of her emphysema being homogeneous, her residual volume would need to be greater than 200% of predicted to even consider valves.  Last PFT her RV was 160% predicted.  Only, her diffusion capacity of 10% on prior PFT would be a contraindication if it remained that low.  It would need to be above 20% predicted.    Cigarette nicotine dependence without complication  Referred to the smoking cessation program    Return in about 6 months (around 2/7/2025).    Visit orders:    Diagnoses and all orders for this visit:    COPD, severe (HCC)    Cigarette nicotine dependence without complication        History of Present Illness   HPI:  Estela Javed is a 79 y.o. female who is here today for follow-up regarding COPD.  Since her last visit, she has thought a bit more about the possibility of bronchoscopic lung reduction and remains very hesitant.  She continues to smoke and has been set up with our smoking cessation team to help her quit.  She reports persistent symptoms of shortness of breath most notable with exertion.  No significant cough, wheeze or sputum production.  She is compliant with Anoro Ellipta but finds that it causes significant constipation.  Breztri  caused congestion in the past.    Review of Systems   Constitutional:  Negative for chills, fever and unexpected weight change.   HENT:  Negative for postnasal drip and sore throat.    Eyes:  Negative for visual disturbance.   Respiratory:          As noted in HPI   Cardiovascular:  Negative for chest pain.   Gastrointestinal:  Positive for constipation. Negative for abdominal pain, diarrhea and vomiting.   Musculoskeletal:  Negative for arthralgias.   Skin:  Negative for rash.   Neurological:  Negative for headaches.   Hematological:  Negative for adenopathy.   Psychiatric/Behavioral: Negative.     All other systems reviewed and are negative.      Medical, Family and Social history reviewed and updated as appropriate    Historical Information   Past Medical History:   Diagnosis Date    COPD (chronic obstructive pulmonary disease) ([facility])     Emphysema of lung ([facility]) 5/2023    Pleural effusion 3/14/24    Pneumonia 3/13/24     Past Surgical History:   Procedure Laterality Date    CHOLECYSTECTOMY  5/2014     Family History   Problem Relation Age of Onset    COPD Father     COPD Brother        Social History     Tobacco Use   Smoking Status Some Days    Current packs/day: 0.25    Average packs/day: 0.3 packs/day for 50.0 years (12.5 ttl pk-yrs)    Types: Cigarettes    Start date: 8/8/1974   Smokeless Tobacco Never   Tobacco Comments    Quit [id number]       Meds/Allergies     Current Outpatient Medications:     albuterol (Ventolin HFA) 90 mcg/act inhaler, Inhale 2 puffs every 6 (six) hours as needed for wheezing, Disp: 18 g, Rfl: 5    Anoro Ellipta 62.5-25 MCG/ACT inhaler, , Disp: , Rfl:     ascorbic acid (VITAMIN C) 1000 MG tablet, Take 1,000 mg by mouth daily, Disp: , Rfl:     EPINEPHrine (EPIPEN) 0.3 mg/0.3 mL SOAJ, INJECT 1 PEN IN THE MUSCLE SINGLE DOSE AS NEEDED FOR ANAPHYLAXIS. REPEAT DOSE IN 5-10 MINUTES IF SYMPTOMS PERSIST. CALL 911 OR GO TO ER IF NEEDED, Disp: , Rfl:     Cholecalciferol 125 MCG (5000 UT) TABS,  "Take 5,000 Units by mouth daily, Disp: , Rfl:     Coenzyme Q10 200 MG capsule, Take 200 mg by mouth daily, Disp: , Rfl:     Combivent Respimat inhaler, INHALE 1 PUFF BY MOUTH EVERY 4 HOURS AS DIRECTED AS NEEDED (Patient not taking: Reported on 8/7/2024), Disp: , Rfl:   Allergies   Allergen Reactions    Bacteriophages Shortness Of Breath    Bee Venom Anaphylaxis    Ciprofloxacin Dizziness, Other (See Comments), Palpitations, Shortness Of Breath and Tremor     Tremors, heart palpitations    Sulfa Antibiotics Shortness Of Breath and Wheezing     Had trouble swallowing    Nitrofurantoin GI Intolerance and Nausea Only    Oxycodone-Aspirin Rash    Penicillins Diarrhea    Shellfish Allergy - Food Allergy Hives and Vomiting    Hornet Venom Itching and Rash    Mixed Vespid Venom Itching and Rash    Other Itching and Rash    Wasp Venom Itching and Rash    Yellow Jacket Venom Itching and Rash       Vitals: Blood pressure 116/70, pulse 80, temperature (!) 97 °F (36.1 °C), height 5' 4\" (1.626 m), weight 42.6 kg (94 lb), SpO2 94%. Body mass index is 16.14 kg/m². Oxygen Therapy  SpO2: 94 %    Physical Exam   Physical Exam  Vitals reviewed.   Constitutional:       General: She is not in acute distress.     Appearance: She is well-developed.   Eyes:      General: No scleral icterus.  Neck:      Vascular: No JVD.   Cardiovascular:      Rate and Rhythm: Normal rate and regular rhythm.   Skin:     General: Skin is warm and dry.   Neurological:      Mental Status: She is alert and oriented to person, place, and time.         Labs: I have personally reviewed pertinent lab results.  Lab Results   Component Value Date    WBC 12.22 (H) 03/14/2024    HGB 15.8 (H) 03/14/2024    HCT 49.9 (H) 03/14/2024    MCV 97 03/14/2024     03/14/2024     Lab Results   Component Value Date    GLUCOSE 87 12/30/2014    CALCIUM 9.7 03/14/2024     12/30/2014    K 3.9 03/14/2024    CO2 29 03/14/2024     03/14/2024    BUN 14 03/14/2024    " "CREATININE 0.74 03/14/2024     No results found for: \"IGE\"  Lab Results   Component Value Date    ALT 27 03/14/2024    AST 24 03/14/2024    GGT 98 (H) 12/30/2014    ALKPHOS 105 (H) 03/14/2024    BILITOT 0.5 12/30/2014     Imaging and other studies: I have personally reviewed pertinent reports.   and I have personally reviewed pertinent films in PACS chest CT from 6/6/2024 shows resolution of previous right upper lobe infiltrate, severe emphysema    Pulmonary function testing:  Performed 6/12/2023 and personally interpreted  FEV1/FVC ratio 44%    FEV1 0.73, 33% predicted  FVC 59% predicted  No response to bronchodilators   % predicted   % predicted  DLCO corrected for hemoglobin 10 % predicted  PFTs show severe obstruction, moderate air trapping and severe diffusion impairment  "

## 2024-08-07 NOTE — ASSESSMENT & PLAN NOTE
She finds Anoro Ellipta to be beneficial, but is causing some constipation.  We discussed the possibility of changing inhalers, however she would like to continue with current regimen and make no changes.  We again discussed the risks and benefits of bronchoscopic lung volume reduction.  She understands that even if she were able to quit smoking, she would need additional testing to determine her candidacy.  She is very concerned about the possible risks, which is certainly understandable.  We agreed that we will focus our efforts for now on smoking cessation with plans to update PFTs once she achieves that goal.  Given the distribution of her emphysema being homogeneous, her residual volume would need to be greater than 200% of predicted to even consider valves.  Last PFT her RV was 160% predicted.  Only, her diffusion capacity of 10% on prior PFT would be a contraindication if it remained that low.  It would need to be above 20% predicted.

## 2024-08-12 ENCOUNTER — CLINICAL SUPPORT (OUTPATIENT)
Dept: PULMONOLOGY | Facility: CLINIC | Age: 79
End: 2024-08-12
Payer: MEDICARE

## 2024-08-12 DIAGNOSIS — J44.9 COPD, SEVERE (HCC): Primary | ICD-10-CM

## 2024-08-12 PROCEDURE — 94625 PHY/QHP OP PULM RHB W/O MNTR: CPT

## 2024-08-14 ENCOUNTER — CLINICAL SUPPORT (OUTPATIENT)
Dept: PULMONOLOGY | Facility: CLINIC | Age: 79
End: 2024-08-14
Payer: MEDICARE

## 2024-08-14 DIAGNOSIS — J44.9 COPD, SEVERE (HCC): Primary | ICD-10-CM

## 2024-08-14 PROCEDURE — 94625 PHY/QHP OP PULM RHB W/O MNTR: CPT

## 2024-08-19 ENCOUNTER — APPOINTMENT (OUTPATIENT)
Dept: PULMONOLOGY | Facility: CLINIC | Age: 79
End: 2024-08-19
Payer: MEDICARE

## 2024-08-21 ENCOUNTER — CLINICAL SUPPORT (OUTPATIENT)
Dept: PULMONOLOGY | Facility: CLINIC | Age: 79
End: 2024-08-21
Payer: MEDICARE

## 2024-08-21 DIAGNOSIS — J44.9 COPD, SEVERE (HCC): Primary | ICD-10-CM

## 2024-08-21 PROCEDURE — 94625 PHY/QHP OP PULM RHB W/O MNTR: CPT

## 2024-08-26 ENCOUNTER — CLINICAL SUPPORT (OUTPATIENT)
Dept: PULMONOLOGY | Facility: CLINIC | Age: 79
End: 2024-08-26
Payer: MEDICARE

## 2024-08-26 DIAGNOSIS — J44.9 COPD, SEVERE (HCC): Primary | ICD-10-CM

## 2024-08-26 PROCEDURE — 94625 PHY/QHP OP PULM RHB W/O MNTR: CPT

## 2024-08-28 ENCOUNTER — CLINICAL SUPPORT (OUTPATIENT)
Dept: PULMONOLOGY | Facility: CLINIC | Age: 79
End: 2024-08-28
Payer: MEDICARE

## 2024-08-28 DIAGNOSIS — J44.9 COPD, SEVERE (HCC): Primary | ICD-10-CM

## 2024-08-28 PROCEDURE — 94625 PHY/QHP OP PULM RHB W/O MNTR: CPT

## 2024-08-28 NOTE — PROGRESS NOTES
PULMONARY REHABILITATION   ASSESSMENT AND INDIVIDUALIZED TREATMENT PLAN  120 DAY    Today's date: 2024  # of Exercise Sessions Completed: 25  Patient name: Estela Javed     : 1945       MRN: [id number]  PCP: Addison Ha MD  Referring Physician: Delmer Robertson MD  Pulmonologist: Mariajose Worrell DO   Provider: Eyad  Clinician: Edgard Keller MS, CEP       ASSESSMENT    PULMONARY   Dx:   Encounter Diagnosis   Name Primary?    COPD, severe (HCC) Yes     Date of onset: 4/3/24  Description of Diagnosis: COPD, very severe - recently diagnosed, knew due to increased SOB at home   Other Pulmonary History: recent hospitalization due to pneumonia    COMMENTS: Looking at get qualified for [email or web address]   Set up appt with [facility] for November     PFT:    does have a PFT on file  FEV1/FVC ratio of 44% and an   FEV1 of 33% predicted  very severeobstruction.    Pulmonary Disease Risk Factors:  none    Medical History:   Past Medical History:   Diagnosis Date    COPD (chronic obstructive pulmonary disease) ([facility])     Emphysema of lung ([facility]) 2023    Pleural effusion 3/14/24    Pneumonia 3/13/24       Family History:  Family History   Problem Relation Age of Onset    COPD Father     COPD Brother        Allergies:   Bacteriophages, Bee venom, Ciprofloxacin, Sulfa antibiotics, Nitrofurantoin, Oxycodone-aspirin, Penicillins, Shellfish allergy - food allergy, Hornet venom, Mixed vespid venom, Other, Wasp venom, and Yellow jacket venom    Current Medications:   Current Outpatient Medications   Medication Sig Dispense Refill    albuterol (Ventolin HFA) 90 mcg/act inhaler Inhale 2 puffs every 6 (six) hours as needed for wheezing 18 g 5    Anoro Ellipta 62.5-25 MCG/ACT inhaler       ascorbic acid (VITAMIN C) 1000 MG tablet Take 1,000 mg by mouth daily      Cholecalciferol 125 MCG (5000 UT) TABS Take 5,000 Units by mouth daily      Coenzyme Q10 200 MG capsule Take 200 mg by mouth daily       "Combivent Respimat inhaler INHALE 1 PUFF BY MOUTH EVERY 4 HOURS AS DIRECTED AS NEEDED (Patient not taking: Reported on 8/7/2024)      EPINEPHrine (EPIPEN) 0.3 mg/0.3 mL SOAJ INJECT 1 PEN IN THE MUSCLE SINGLE DOSE AS NEEDED FOR ANAPHYLAXIS. REPEAT DOSE IN 5-10 MINUTES IF SYMPTOMS PERSIST. CALL 911 OR GO TO ER IF NEEDED       No current facility-administered medications for this visit.       Medication compliance: Yes   Comments: Pt reports to be compliant with medications    Cultural needs: none    Readiness to change: Preparation:  (Getting ready to change)       EXERCISE ASSESSMENT:      FUNCTIONAL STATUS:  Current Status:  Occupation: retired  Recreation: [employer]   ADL’s:Capable of performing light ADLs only limited by Dyspnea, Weakness  Alicia: No limitations  Home Exercise/Equipment: light walking - leisure  Other: none     Physical Limitations: herniated discs in lower back     Fall Risk: Low   Comments: Ambulates with a steady gait with no assist device    Initial Fitness Assessment: 6MWT:  Resting:    Resting:  BP: 104/70  HR: 86  SpO2: 96%  Dyspnea: 0, Exercise:  BP: 142/74  HR: 102  SpO2: 88%  Dyspnea: 6, METs:  2.2, and ECG Summary: NSR with occ PVCs       NUTRITION ASSESSMENT:    Height:   Ht Readings from Last 1 Encounters:   08/07/24 5' 4\" (1.626 m)       Weight control:    Starting weight: 93 lbs    Current weight: 95.8 lbs     Rate Your Plate Score: 65/81    Diabetes: N/A    Current Dietary Habits:  Minimal dairy   Rare meat eater   Has IBS - limites diet slightly     Drug/Alcohol Use: No      PSYCHOSOCIAL ASSESSMENT:    Depression screening:  PHQ-9 = 3    Interpretation:  1-4 = Minimal Depression  Last Assessed: 5/8/24    Anxiety screening:  THOMAS-7 = 1    Interpretation: 0-4  = Not anxious  Last Assessed: 5/8/24    Pt self-report of depression and anxiety:   Patient reports feelings of anxiety  Reports sufficient emotional support     Self-reported stress level:  5   Stressors:  " house problems   Stress Management Tools: practice Relaxation Techniques, exercise, keep a positive mindset, spend time outside, and enjoy a hobby    Quality of Life Screen:  (Higher score indicates disease impact on QOL)  [facility] COOP score: 25/40        Social Support: children, siblings, and friends  Community/Social Activities: none      Psychosocial Assessment as it relates to rehabilitation:   Patient denies issues with his/her family or home life that may affect their rehabilitation efforts.       OTHER CORE COMPONENT ASSESSMENT:    Tobacco Use: Brief counseling by cardiac rehab professional  Date: 5/8/24  Pt continues to smoke 0.25 ppd   Tobacco Intervention: Discussed barriers to quitting and steps to work toward a quit date    Hospitalizations in the past year: 1    Oxygen needs:   Rest:  room air  Exercise/physical activity:  room air  Sleep:   room air    Does Pt monitor home SpO2? yes   Average SpO2 at rest: 90%   Average SpO2 with ADLs/physical activity: 90%    Breathing Treatments:   Rescue Inhaler:  PRN  Maintenance Inhaler: Yes:  1 times per day  Nebulizer Treatments:  No  Patient practices breathing techniques at home:  Reviewed with patient on:  5/8/24      INDIVIDUALIZED TREATMENT PLAN    Patient will attend 23 monitored exercise sessions beginning 5/13/24.    See outlined plan of care below for specific patient goals in each component of care.      PATIENT SPECIFIC GOALS:     Exercise: (home exercise, ADLs)  reduced dependence on supplemental O2, attend pulmonary rehab regularly, decrease sitting time at home, start a walking program, begin a consistent exercise regimen , decreased rest needed with physical activity/exercise, increased muscular strength, increased energy, and increased stamina with ADLs  Improve exercise outside - walking    Nutrition: (wt control, diabetes management, dietary modifications)  increase water intake to reduce/thin mucus production eat less CO2 producing foods  "improve hydration weight gain   Psychosocial: (stress, emotional well being, social support)  maintain compliance with medical therapy  Find ways to get out of the house   Consider different relaxation methods instead of smoking    Core Component: (smoking, BP control, angina control, medication)  reduced dietary sodium <2000mg, follow fluid restriction as advised, medication compliance, and tobacco cessation in 12  weeks    SMART GOALS:  Exercise:   reduced score on CAT, improved 6MWT distance, reduced dyspnea during exercise, improved exercise tolerance based on peak METs tolerated in pulmonary rehab exercise session, SpO2 >88% during exercise, and reduced RPD at rest   Nutrition:   Improved Rate Your Plate score  >64, choose 1% or skim milk, use \"light\" tub margarine on bread, potatoes and vegetables, and choose healthy snacks such as fruit, pretzels, and low fat crackers   Psychosocial:   Reduce perceived stress to 1-3/10, improved [facility] QOL < 27, Physical Fitness in [facility] Score < 3, Social Support in [facility] Score < 3, Daily Activity in [facility] Score < 3, Social Activities in [facility] Score < 3, Pain in [facility] Score < 3, Overall Health in [facility] Score < 3, Quality of Life in [facility] Score < 3 , Change in Health in [facility] Score < 3 , reduced incidence of restlessness and/or lethargy, reduced time feeling nervous or on edge, and take time to relax    Core Components:   consistent, controlled resting BP < 130/80, reduced number of cigarettes per day, and medication compliance      EXERCISE PLAN    Progress toward SMART and personal Exercise goals:  Progressing: attending rehab and extended sessions for medical necessity, increasing strength and endurance, feels better following exercise each session, SpO2 >88% at rest and mostly with exertion, and use of PLB. Goals not met: no formal home exercise with rehab and continues to report SALINAS. Weather plays part in breathing      Group and " Individual Education: pursed lipped breathing, diaphragmatic breathing, fall prevention while using nasal canula tubing, relaxation breathing, home exercise guidelines, benefits of exercise for pulmonary disease, RPE scale, RPD scale, O2 saturation monitoring, and education class: Exercise For The Pulmonary Patient    Plan for next 30 days:    Titrate supplemental oxygen as needed to maintain SpO2>88% with exercise, learn to conserve energy with ADLs , practice diaphragmatic breathing, reduce time sitting at home, and increased strength of respiratory muscles    Current Aerobic Exercise Prescription:      Frequency: 2 days/week *Supplement with home exercise 2+ days/wk as tolerated        Minutes: 40-50         METS: 1.8-3.5              SpO2: 92-94% on RA               RPD: 3-4         HR:     RPE: 4-6         Modalities: Treadmill, UBE, NuStep, and Recumbent bike     Exercise workloads will be progressed gradually as tolerated, within limits of patient's ability, and according to the patient's response to the exercise program.    Aerobic Exercise Prescription - 30 day goal:   Frequency: 12 days/week *Supplement with home exercise 2+ days/wk as tolerated        Minutes: 45-60          METS: 3.6-4.0              SpO2: >88% on RA               RPD: 4-6          HR: RHR +30-40bpm   RPE: 4-6         Modalities: Treadmill, UBE, NuStep, and Recumbent bike     Strength trainin-3 days / week  12-15 repetitions  1-2 sets per modality    Modalities: Lateral Raise, Arm Curl, Sit to Stands, Upright Rows, and Front Raises    Supplemental Oxygen Needs with Exercise:  room air.    Home Exercise: none    The patient was counseled on exercise guidelines to achieve a minimum of 150 mins/wk of moderate intensity (RPE 4-6) exercise and encouraged to add 1-2 days of exercise on opposite days of cardiac rehab as tolerated.       NUTRITION PLAN    Patient's progress toward SMART and personal Nutrition goals:   Progressing: No  concerns with weight. Pt continues to have same diet with no changes. Working on increasing healthy weight gain      Group and Individual Education:   heart healthy eating principles  maintaining hydration  group class:  Label Reading    Plan for next 30 days:   group class: Reading Food Labels, group Class: Heart Healthy Eating, choose healthy meals while dining out, use light or fat-free salad dressings and mayonnaise, eat healthy snacks like fruit, pretzels, and low fat crackers, choose desserts such as fruit, francis food cake, low-fat or fat-free sweets, never/rarely add salt to food when cooking or at the table, rarely/never eat salty snacks, and choose low sugar desserts and sweets    Measurable goals were based Rate Your Plate Dietary Self-Assessment. These are the areas in which the patient could score higher on the assessment. Goals include recommendations for a heart healthy diet based on American Heart Association.      PSYCHOSOCIAL PLAN    Patient's progress toward SMART and personal Psychosocial goals:   Progressing: Acknowledges anxiety problems and managing stress well     Group and Individual Education: stress management techniques, tools to manage fear/anxiety related to becoming SOB, benefits of mental health counseling, and class:  Stress and Pulmonary Disease    Plan for next 30 days:   Refer to behavioral health/counseling, Practice relaxation techniques, Exercise, Learn how to relax and slow down, Join a support group , and Meet new people    Information to utilize [employer] was provided as well as contact information for counseling through  Behavioral Health and group psychotherapy groups available.      OTHER CORE COMPONENTS PLAN    Blood pressure:    Restin//72   Exercise: 94//78   Recovery: 92//70    Pt will be encouraged to monitor home BP if advised by cardiologist.    Progress toward SMART and personal Core Component goals:    Progressing: blood pressures  within normal limits at rest and exercise and compliant with maintenance inhaler. Not Progressing: continues to smoke  - has appt in November with Smoking Cessation at [facility] !    Group and Individual Education:  effects of nicotine and tobacco, identifying smoking triggers, how to set a smoking cessation plan, smoking cessation tools and methods, and Education: Medication Management and Control Breathing    Plan for next 30 days:   group class: Understanding Heart Disease, medication compliance, set target quit date for smoking, reduce number of cigarettes per day, avoid places with second hand smoke, engage in regular exercise, group class: Pulmonary Anatomy and Physiology, and refer to [facility] Smoking Cessation Program

## 2024-09-04 ENCOUNTER — APPOINTMENT (OUTPATIENT)
Dept: PULMONOLOGY | Facility: CLINIC | Age: 79
End: 2024-09-04
Payer: MEDICARE

## 2024-09-05 ENCOUNTER — APPOINTMENT (OUTPATIENT)
Dept: PULMONOLOGY | Facility: CLINIC | Age: 79
End: 2024-09-05
Payer: MEDICARE

## 2024-09-09 ENCOUNTER — CLINICAL SUPPORT (OUTPATIENT)
Dept: PULMONOLOGY | Facility: CLINIC | Age: 79
End: 2024-09-09
Payer: MEDICARE

## 2024-09-09 DIAGNOSIS — J44.9 COPD, SEVERE (HCC): Primary | ICD-10-CM

## 2024-09-09 PROCEDURE — 94625 PHY/QHP OP PULM RHB W/O MNTR: CPT

## 2024-09-11 ENCOUNTER — CLINICAL SUPPORT (OUTPATIENT)
Dept: PULMONOLOGY | Facility: CLINIC | Age: 79
End: 2024-09-11
Payer: MEDICARE

## 2024-09-11 DIAGNOSIS — J44.9 COPD, SEVERE (HCC): Primary | ICD-10-CM

## 2024-09-11 PROCEDURE — 94625 PHY/QHP OP PULM RHB W/O MNTR: CPT

## 2024-09-16 ENCOUNTER — CLINICAL SUPPORT (OUTPATIENT)
Dept: PULMONOLOGY | Facility: CLINIC | Age: 79
End: 2024-09-16
Payer: MEDICARE

## 2024-09-16 DIAGNOSIS — J44.9 COPD, SEVERE (HCC): Primary | ICD-10-CM

## 2024-09-16 PROCEDURE — 94625 PHY/QHP OP PULM RHB W/O MNTR: CPT

## 2024-09-18 ENCOUNTER — APPOINTMENT (OUTPATIENT)
Dept: PULMONOLOGY | Facility: CLINIC | Age: 79
End: 2024-09-18
Payer: MEDICARE

## 2024-09-23 ENCOUNTER — CLINICAL SUPPORT (OUTPATIENT)
Dept: PULMONOLOGY | Facility: CLINIC | Age: 79
End: 2024-09-23
Payer: MEDICARE

## 2024-09-23 DIAGNOSIS — J44.9 COPD, SEVERE (HCC): Primary | ICD-10-CM

## 2024-09-23 PROCEDURE — 94625 PHY/QHP OP PULM RHB W/O MNTR: CPT

## 2024-09-25 ENCOUNTER — CLINICAL SUPPORT (OUTPATIENT)
Dept: PULMONOLOGY | Facility: CLINIC | Age: 79
End: 2024-09-25
Payer: MEDICARE

## 2024-09-25 DIAGNOSIS — J44.9 COPD, SEVERE (HCC): Primary | ICD-10-CM

## 2024-09-25 PROCEDURE — 94625 PHY/QHP OP PULM RHB W/O MNTR: CPT

## 2024-09-25 NOTE — PROGRESS NOTES
PULMONARY REHABILITATION   ASSESSMENT AND INDIVIDUALIZED TREATMENT PLAN  150 DAY    Today's date: 2024  # of Exercise Sessions Completed: 30  Patient name: Estela Javed     : 1945       MRN: [id number]  PCP: Addison Ha MD  Referring Physician: Delmer Robertson MD  Pulmonologist: Mariajose Worrell DO   Provider: Eyad  Clinician: Edgard Keller MS, CEP       ASSESSMENT    PULMONARY   Dx:   Encounter Diagnosis   Name Primary?    COPD, severe (HCC) Yes     Date of onset: 4/3/24  Description of Diagnosis: COPD, very severe - recently diagnosed, knew due to increased SOB at home   Other Pulmonary History: recent hospitalization due to pneumonia    COMMENTS: Looking at get qualified for [employer]   Set up appt with [facility] for November     PFT:    does have a PFT on file  FEV1/FVC ratio of 44% and an   FEV1 of 33% predicted  very severeobstruction.    Pulmonary Disease Risk Factors:  none    Medical History:   Past Medical History:   Diagnosis Date    COPD (chronic obstructive pulmonary disease) ([facility])     Emphysema of lung ([facility]) 2023    Pleural effusion 3/14/24    Pneumonia 3/13/24       Family History:  Family History   Problem Relation Age of Onset    COPD Father     COPD Brother        Allergies:   Bacteriophages, Bee venom, Ciprofloxacin, Sulfa antibiotics, Nitrofurantoin, Oxycodone-aspirin, Penicillins, Shellfish allergy - food allergy, Hornet venom, Mixed vespid venom, Other, Wasp venom, and Yellow jacket venom    Current Medications:   Current Outpatient Medications   Medication Sig Dispense Refill    albuterol (Ventolin HFA) 90 mcg/act inhaler Inhale 2 puffs every 6 (six) hours as needed for wheezing 18 g 5    Anoro Ellipta 62.5-25 MCG/ACT inhaler       ascorbic acid (VITAMIN C) 1000 MG tablet Take 1,000 mg by mouth daily      Cholecalciferol 125 MCG (5000 UT) TABS Take 5,000 Units by mouth daily      Coenzyme Q10 200 MG capsule Take 200 mg by mouth daily       "Combivent Respimat inhaler INHALE 1 PUFF BY MOUTH EVERY 4 HOURS AS DIRECTED AS NEEDED (Patient not taking: Reported on 8/7/2024)      EPINEPHrine (EPIPEN) 0.3 mg/0.3 mL SOAJ INJECT 1 PEN IN THE MUSCLE SINGLE DOSE AS NEEDED FOR ANAPHYLAXIS. REPEAT DOSE IN 5-10 MINUTES IF SYMPTOMS PERSIST. CALL 911 OR GO TO ER IF NEEDED       No current facility-administered medications for this visit.       Medication compliance: Yes   Comments: Pt reports to be compliant with medications    Cultural needs: none    Readiness to change: Preparation:  (Getting ready to change)       EXERCISE ASSESSMENT:      FUNCTIONAL STATUS:  Current Status:  Occupation: retired  Recreation: [employer]   ADL’s:Capable of performing light ADLs only limited by Dyspnea, Weakness  Apache: No limitations  Home Exercise/Equipment: light walking - leisure  Other: none     Physical Limitations: herniated discs in lower back     Fall Risk: Low   Comments: Ambulates with a steady gait with no assist device    Initial Fitness Assessment: 6MWT:  Resting:    Resting:  BP: 104/70  HR: 86  SpO2: 96%  Dyspnea: 0, Exercise:  BP: 142/74  HR: 102  SpO2: 88%  Dyspnea: 6, METs:  2.2, and ECG Summary: NSR with occ PVCs       NUTRITION ASSESSMENT:    Height:   Ht Readings from Last 1 Encounters:   08/07/24 5' 4\" (1.626 m)       Weight control:    Starting weight: 93 lbs    Current weight: 95.4 lbs     Rate Your Plate Score: 65/81    Diabetes: N/A    Current Dietary Habits:  Minimal dairy   Rare meat eater   Has IBS - limites diet slightly     Drug/Alcohol Use: No      PSYCHOSOCIAL ASSESSMENT:    Depression screening:  PHQ-9 = 3    Interpretation:  1-4 = Minimal Depression  Last Assessed: 5/8/24    Anxiety screening:  THOMAS-7 = 1    Interpretation: 0-4  = Not anxious  Last Assessed: 5/8/24    Pt self-report of depression and anxiety:   Patient reports feelings of anxiety  Reports sufficient emotional support     Self-reported stress level:  5   Stressors:  " house problems   Stress Management Tools: practice Relaxation Techniques, exercise, keep a positive mindset, spend time outside, and enjoy a hobby    Quality of Life Screen:  (Higher score indicates disease impact on QOL)  [facility] COOP score: 25/40        Social Support: children, siblings, and friends  Community/Social Activities: none      Psychosocial Assessment as it relates to rehabilitation:   Patient denies issues with his/her family or home life that may affect their rehabilitation efforts.       OTHER CORE COMPONENT ASSESSMENT:    Tobacco Use: Brief counseling by cardiac rehab professional  Date: 5/8/24  Pt continues to smoke 0.25 ppd   Tobacco Intervention: Discussed barriers to quitting and steps to work toward a quit date    Hospitalizations in the past year: 1    Oxygen needs:   Rest:  room air  Exercise/physical activity:  room air  Sleep:   room air    Does Pt monitor home SpO2? yes   Average SpO2 at rest: 90%   Average SpO2 with ADLs/physical activity: 90%    Breathing Treatments:   Rescue Inhaler:  PRN  Maintenance Inhaler: Yes:  1 times per day  Nebulizer Treatments:  No  Patient practices breathing techniques at home:  Reviewed with patient on:  5/8/24      INDIVIDUALIZED TREATMENT PLAN    Patient will attend 23 monitored exercise sessions beginning 5/13/24.    See outlined plan of care below for specific patient goals in each component of care.      PATIENT SPECIFIC GOALS:     Exercise: (home exercise, ADLs)  reduced dependence on supplemental O2, attend pulmonary rehab regularly, decrease sitting time at home, start a walking program, begin a consistent exercise regimen , decreased rest needed with physical activity/exercise, increased muscular strength, increased energy, and increased stamina with ADLs  Improve exercise outside - walking    Nutrition: (wt control, diabetes management, dietary modifications)  increase water intake to reduce/thin mucus production eat less CO2 producing foods  "improve hydration weight gain   Psychosocial: (stress, emotional well being, social support)  maintain compliance with medical therapy  Find ways to get out of the house   Consider different relaxation methods instead of smoking    Core Component: (smoking, BP control, angina control, medication)  reduced dietary sodium <2000mg, follow fluid restriction as advised, medication compliance, and tobacco cessation in 12  weeks    SMART GOALS:  Exercise:   reduced score on CAT, improved 6MWT distance, reduced dyspnea during exercise, improved exercise tolerance based on peak METs tolerated in pulmonary rehab exercise session, SpO2 >88% during exercise, and reduced RPD at rest   Nutrition:   Improved Rate Your Plate score  >64, choose 1% or skim milk, use \"light\" tub margarine on bread, potatoes and vegetables, and choose healthy snacks such as fruit, pretzels, and low fat crackers   Psychosocial:   Reduce perceived stress to 1-3/10, improved [facility] QOL < 27, Physical Fitness in [facility] Score < 3, Social Support in [facility] Score < 3, Daily Activity in [facility] Score < 3, Social Activities in [facility] Score < 3, Pain in [facility] Score < 3, Overall Health in [facility] Score < 3, Quality of Life in [facility] Score < 3 , Change in Health in [facility] Score < 3 , reduced incidence of restlessness and/or lethargy, reduced time feeling nervous or on edge, and take time to relax    Core Components:   consistent, controlled resting BP < 130/80, reduced number of cigarettes per day, and medication compliance      EXERCISE PLAN    Progress toward SMART and personal Exercise goals:  Progressing: attending rehab and extended sessions for medical necessity (36), increasing strength and endurance, feels better following exercise each session, was able to move fridge the other day, SpO2 >88% at rest and mostly with exertion, and use of PLB. Goals not met: no formal home exercise with rehab and continues to report SALINAS. Weather " plays part in breathing      Group and Individual Education: pursed lipped breathing, diaphragmatic breathing, fall prevention while using nasal canula tubing, relaxation breathing, home exercise guidelines, benefits of exercise for pulmonary disease, RPE scale, RPD scale, O2 saturation monitoring, and education class: Exercise For The Pulmonary Patient    Plan for next 30 days:    Titrate supplemental oxygen as needed to maintain SpO2>88% with exercise, learn to conserve energy with ADLs , practice diaphragmatic breathing, reduce time sitting at home, and increased strength of respiratory muscles    Current Aerobic Exercise Prescription:      Frequency: 2 days/week *Supplement with home exercise 2+ days/wk as tolerated        Minutes: 40-50         METS: 1.8-3.5              SpO2: 92-94% on RA               RPD: 3-4         HR:     RPE: 4-6         Modalities: Treadmill, UBE, NuStep, and Recumbent bike     Exercise workloads will be progressed gradually as tolerated, within limits of patient's ability, and according to the patient's response to the exercise program.    Aerobic Exercise Prescription - 30 day goal:   Frequency: 12 days/week *Supplement with home exercise 2+ days/wk as tolerated        Minutes: 45-60          METS: 3.6-4.0              SpO2: >88% on RA               RPD: 4-6          HR: RHR +30-40bpm   RPE: 4-6         Modalities: Treadmill, UBE, NuStep, and Recumbent bike     Strength trainin-3 days / week  12-15 repetitions  1-2 sets per modality    Modalities: Lateral Raise, Arm Curl, Sit to Stands, Upright Rows, and Front Raises    Supplemental Oxygen Needs with Exercise:  room air.    Home Exercise: none    The patient was counseled on exercise guidelines to achieve a minimum of 150 mins/wk of moderate intensity (RPE 4-6) exercise and encouraged to add 1-2 days of exercise on opposite days of cardiac rehab as tolerated.       NUTRITION PLAN    Patient's progress toward SMART and  personal Nutrition goals:   Progressing: No concerns with weight. Pt continues to have same diet with no changes. Working on increasing healthy weight gain      Group and Individual Education:   heart healthy eating principles  maintaining hydration  group class:  Label Reading    Plan for next 30 days:   group class: Reading Food Labels, group Class: Heart Healthy Eating, choose healthy meals while dining out, use light or fat-free salad dressings and mayonnaise, eat healthy snacks like fruit, pretzels, and low fat crackers, choose desserts such as fruit, francis food cake, low-fat or fat-free sweets, never/rarely add salt to food when cooking or at the table, rarely/never eat salty snacks, and choose low sugar desserts and sweets    Measurable goals were based Rate Your Plate Dietary Self-Assessment. These are the areas in which the patient could score higher on the assessment. Goals include recommendations for a heart healthy diet based on American Heart Association.      PSYCHOSOCIAL PLAN    Patient's progress toward SMART and personal Psychosocial goals:   Progressing: Acknowledges anxiety problems and managing stress well. Reports feeling better since coming to rehab     Group and Individual Education: stress management techniques, tools to manage fear/anxiety related to becoming SOB, benefits of mental health counseling, and class:  Stress and Pulmonary Disease    Plan for next 30 days:   Refer to behavioral health/counseling, Practice relaxation techniques, Exercise, Learn how to relax and slow down, Join a support group , and Meet new people    Information to utilize [employer] was provided as well as contact information for counseling through  Behavioral Health and group psychotherapy groups available.      OTHER CORE COMPONENTS PLAN    Blood pressure:    Restin//72   Exercise: 94//78   Recovery: 92//70    Pt will be encouraged to monitor home BP if advised by  cardiologist.    Progress toward SMART and personal Core Component goals:    Progressing: blood pressures within normal limits at rest and exercise and compliant with maintenance inhaler. Not Progressing: continues to smoke  - has appt in November with Smoking Cessation at [facility] !    Group and Individual Education:  effects of nicotine and tobacco, identifying smoking triggers, how to set a smoking cessation plan, smoking cessation tools and methods, and Education: Medication Management and Control Breathing    Plan for next 30 days:   group class: Understanding Heart Disease, medication compliance, set target quit date for smoking, reduce number of cigarettes per day, avoid places with second hand smoke, engage in regular exercise, group class: Pulmonary Anatomy and Physiology, and refer to [facility] Smoking Cessation Program

## 2024-09-30 ENCOUNTER — CLINICAL SUPPORT (OUTPATIENT)
Dept: PULMONOLOGY | Facility: CLINIC | Age: 79
End: 2024-09-30
Payer: MEDICARE

## 2024-09-30 DIAGNOSIS — J44.9 COPD, SEVERE (HCC): Primary | ICD-10-CM

## 2024-09-30 PROCEDURE — 94625 PHY/QHP OP PULM RHB W/O MNTR: CPT

## 2024-10-02 ENCOUNTER — CLINICAL SUPPORT (OUTPATIENT)
Dept: PULMONOLOGY | Facility: CLINIC | Age: 79
End: 2024-10-02
Payer: MEDICARE

## 2024-10-02 DIAGNOSIS — J44.9 COPD, SEVERE (HCC): Primary | ICD-10-CM

## 2024-10-02 PROCEDURE — 94625 PHY/QHP OP PULM RHB W/O MNTR: CPT

## 2024-10-07 ENCOUNTER — CLINICAL SUPPORT (OUTPATIENT)
Dept: PULMONOLOGY | Facility: CLINIC | Age: 79
End: 2024-10-07
Payer: MEDICARE

## 2024-10-07 DIAGNOSIS — J44.9 COPD, SEVERE (HCC): Primary | ICD-10-CM

## 2024-10-07 PROCEDURE — 94625 PHY/QHP OP PULM RHB W/O MNTR: CPT

## 2024-10-09 ENCOUNTER — CLINICAL SUPPORT (OUTPATIENT)
Dept: PULMONOLOGY | Facility: CLINIC | Age: 79
End: 2024-10-09
Payer: MEDICARE

## 2024-10-09 DIAGNOSIS — J44.9 COPD, SEVERE (HCC): Primary | ICD-10-CM

## 2024-10-09 PROCEDURE — 94625 PHY/QHP OP PULM RHB W/O MNTR: CPT

## 2024-10-14 ENCOUNTER — APPOINTMENT (OUTPATIENT)
Dept: PULMONOLOGY | Facility: CLINIC | Age: 79
End: 2024-10-14
Payer: MEDICARE

## 2024-10-16 ENCOUNTER — CLINICAL SUPPORT (OUTPATIENT)
Dept: PULMONOLOGY | Facility: CLINIC | Age: 79
End: 2024-10-16
Payer: MEDICARE

## 2024-10-16 DIAGNOSIS — J44.9 COPD, SEVERE (HCC): Primary | ICD-10-CM

## 2024-10-16 PROCEDURE — 94625 PHY/QHP OP PULM RHB W/O MNTR: CPT

## 2024-10-16 NOTE — PROGRESS NOTES
PULMONARY REHABILITATION   ASSESSMENT AND INDIVIDUALIZED TREATMENT PLAN  180 DAY and DISCHARGE      Today's date: 2024  # of Exercise Sessions Completed: 35  Patient name: Estela Javed     : 1945       MRN: [id number]  PCP: Addison Ha MD  Referring Physician: Delmer Robertson MD  Pulmonologist: Mariajose Worrell DO   Provider: Eyad  Clinician: Edgard Keller MS, CEP       ASSESSMENT    PULMONARY   Dx:   Encounter Diagnosis   Name Primary?    COPD, severe (HCC) Yes     Date of onset: 4/3/24  Description of Diagnosis: COPD, very severe - recently diagnosed, knew due to increased SOB at home   Other Pulmonary History: recent hospitalization due to pneumonia    COMMENTS: Looking at get qualified for [employer]   Set up appt with [facility] [place] for November     PFT:    does have a PFT on file  FEV1/FVC ratio of 44% and an   FEV1 of 33% predicted  very severeobstruction.    Pulmonary Disease Risk Factors:  smoking    Medical History:   Past Medical History:   Diagnosis Date    COPD (chronic obstructive pulmonary disease) ([facility])     Emphysema of lung ([facility]) 2023    Pleural effusion 3/14/24    Pneumonia 3/13/24       Family History:  Family History   Problem Relation Age of Onset    COPD Father     COPD Brother        Allergies:   Bacteriophages, Bee venom, Ciprofloxacin, Sulfa antibiotics, Nitrofurantoin, Oxycodone-aspirin, Penicillins, Shellfish allergy - food allergy, Hornet venom, Mixed vespid venom, Other, Wasp venom, and Yellow jacket venom    Current Medications:   Current Outpatient Medications   Medication Sig Dispense Refill    albuterol (Ventolin HFA) 90 mcg/act inhaler Inhale 2 puffs every 6 (six) hours as needed for wheezing 18 g 5    Anoro Ellipta 62.5-25 MCG/ACT inhaler       ascorbic acid (VITAMIN C) 1000 MG tablet Take 1,000 mg by mouth daily      Cholecalciferol 125 MCG (5000 UT) TABS Take 5,000 Units by mouth daily      Coenzyme Q10 200 MG capsule Take 200 mg by mouth  "daily      Combivent Respimat inhaler INHALE 1 PUFF BY MOUTH EVERY 4 HOURS AS DIRECTED AS NEEDED (Patient not taking: Reported on 8/7/2024)      EPINEPHrine (EPIPEN) 0.3 mg/0.3 mL SOAJ INJECT 1 PEN IN THE MUSCLE SINGLE DOSE AS NEEDED FOR ANAPHYLAXIS. REPEAT DOSE IN 5-10 MINUTES IF SYMPTOMS PERSIST. CALL 911 OR GO TO ER IF NEEDED       No current facility-administered medications for this visit.       Medication compliance: Yes   Comments: Pt reports to be compliant with medications    Cultural needs: none    Readiness to change: Preparation:  (Getting ready to change)       EXERCISE ASSESSMENT:      FUNCTIONAL STATUS:  Current Status:  Occupation: retired  Recreation: [employer]   ADL’s:Capable of performing light ADLs only limited by Dyspnea, Weakness  [place]: No limitations  Home Exercise/Equipment: light walking - leisure  Other: none     Physical Limitations: herniated discs in lower back     Fall Risk: Low   Comments: Ambulates with a steady gait with no assist device    Post Fitness Assessment: 6MWT:  Resting:    Resting:  BP: 110/80  HR: 89  SpO2: 97%  Dyspnea: 0/10  Room air, Exercise:  BP: 120/84  HR: 86-88  SpO2: 86-87%  Dyspnea: 4/10  Room air , METs:  2.13, and Comments: 1 rest break. Did not improve from initial 6MWT but is doing better with slower pace and managing her overall SOB.       NUTRITION ASSESSMENT:    Height:   Ht Readings from Last 1 Encounters:   08/07/24 5' 4\" (1.626 m)       Weight control:    Starting weight: 93 lbs    Current weight: 93.0 lbs     Rate Your Plate Score: 65/81    Diabetes: N/A    Current Dietary Habits:  Minimal dairy   Rare meat eater   Has IBS - limites diet slightly     Drug/Alcohol Use: No      PSYCHOSOCIAL ASSESSMENT:    Depression screening:  PHQ-9 = 2    Interpretation:  1-4 = Minimal Depression  Last Assessed: 10/16/24    Anxiety screening:  THOMAS-7 = 1    Interpretation: 0-4  = Not anxious  Last Assessed: 10/16/24    Pt self-report of depression " and anxiety:   Patient reports feelings of anxiety  Reports sufficient emotional support     Self-reported stress level:  5   Stressors:  house problems   Stress Management Tools: practice Relaxation Techniques, exercise, keep a positive mindset, spend time outside, and enjoy a hobby    Quality of Life Screen:  (Higher score indicates disease impact on QOL)  [facility] COOP score: 24/40        Social Support: children, siblings, and friends  Community/Social Activities: none      Psychosocial Assessment as it relates to rehabilitation:   Patient denies issues with his/her family or home life that may affect their rehabilitation efforts.       OTHER CORE COMPONENT ASSESSMENT:    Tobacco Use: Brief counseling by cardiac rehab professional  Date: 5/8/24  Pt continues to smoke 0.25 ppd   Tobacco Intervention: Discussed barriers to quitting and steps to work toward a quit date    Hospitalizations in the past year: 1    Oxygen needs:   Rest:  room air  Exercise/physical activity:  room air  Sleep:   room air    Does Pt monitor home SpO2? yes   Average SpO2 at rest: 90%   Average SpO2 with ADLs/physical activity: 90%    Breathing Treatments:   Rescue Inhaler:  PRN  Maintenance Inhaler: Yes:  1 times per day  Nebulizer Treatments:  No  Patient practices breathing techniques at home:  Reviewed with patient on:  5/8/24      INDIVIDUALIZED TREATMENT PLAN     See outlined plan of care below for specific patient goals in each component of care.      PATIENT SPECIFIC GOALS:     Exercise: (home exercise, ADLs) - Goals met   reduced dependence on supplemental O2, attend pulmonary rehab regularly, decrease sitting time at home, start a walking program, begin a consistent exercise regimen , decreased rest needed with physical activity/exercise, increased muscular strength, increased energy, and increased stamina with ADLs  Improve exercise outside - walking    Nutrition: (wt control, diabetes management, dietary modifications) - Goals  "met   increase water intake to reduce/thin mucus production eat less CO2 producing foods improve hydration weight gain   Psychosocial: (stress, emotional well being, social support)   maintain compliance with medical therapy - Goal met   Find ways to get out of the house - goal met   Consider different relaxation methods instead of smoking - goal not met   Core Component: (smoking, BP control, angina control, medication) - Some met/some not met  reduced dietary sodium <2000mg, follow fluid restriction as advised, medication compliance, and tobacco cessation in 12  weeks    SMART GOALS:  Exercise:   reduced score on CAT, improved 6MWT distance, reduced dyspnea during exercise, improved exercise tolerance based on peak METs tolerated in pulmonary rehab exercise session, SpO2 >88% during exercise, and reduced RPD at rest   Nutrition:   Improved Rate Your Plate score  >64, choose 1% or skim milk, use \"light\" tub margarine on bread, potatoes and vegetables, and choose healthy snacks such as fruit, pretzels, and low fat crackers   Psychosocial:   Reduce perceived stress to 1-3/10, improved [facility] QOL < 27, Physical Fitness in [facility] Score < 3, Social Support in [facility] Score < 3, Daily Activity in [facility] Score < 3, Social Activities in [facility] Score < 3, Pain in [facility] Score < 3, Overall Health in [facility] Score < 3, Quality of Life in [facility] Score < 3 , Change in Health in [facility] Score < 3 , reduced incidence of restlessness and/or lethargy, reduced time feeling nervous or on edge, and take time to relax    Core Components:   consistent, controlled resting BP < 130/80, reduced number of cigarettes per day, and medication compliance      EXERCISE PLAN    Progress toward SMART and personal Exercise goals: Goals met: attended rehab for 35 sessions, reports feeling better overall since start of the program, managing SOB better with slowing down walking pace to go longer, SpO2 was within normal limits " but sometimes off readings due to cold hands/circulation, and increased overall METs 94.4%, Goals not met: no formal home exercise, CAT score did not improve (stayed about the same), and did not improve 6MWT distance, and Patient will be encouraged to maintain lifestyle modifications following discharge.     Group and Individual Education: pursed lipped breathing, diaphragmatic breathing, fall prevention while using nasal canula tubing, relaxation breathing, home exercise guidelines, benefits of exercise for pulmonary disease, RPE scale, RPD scale, O2 saturation monitoring, and education class: Exercise For The Pulmonary Patient    Plan for next 30 days:    After discharge patient will continue to follow a regular exercise regimen with the goal of a minimum of 150 minutes per week of moderate intensity exercise.      PHYSICIAN PRESCRIBED EXERCISE:     Current Aerobic Exercise Prescription:      Frequency: 2 days/week *Supplement with home exercise 2+ days/wk as tolerated        Minutes: 40-50         METS: 1.8-3.5              SpO2: 92-94% on RA               RPD: 3-4         HR:     RPE: 4-6         Modalities: Treadmill, UBE, NuStep, and Recumbent bike     Exercise workloads will be progressed gradually as tolerated, within limits of patient's ability, and according to the patient's response to the exercise program.    Exercise Progression after Discharge:    Frequency: 3-5 days of gym or home exercise   Minutes: 30-50  >150 mins/wk of moderate intensity exercise   METS: 2.0 - 3.5+   HR: 80 - 120    RPE: 4-6   Modalities: Treadmill, UBE, and NuStep     Strength trainin-3 days / week  12-15 repetitions  1-2 sets per modality    Modalities: Lateral Raise, Arm Curl, Sit to Stands, Upright Rows, and Front Raises    Supplemental Oxygen Needs with Exercise:  room air.    Home Exercise: none    The patient was counseled on exercise guidelines to achieve a minimum of 150 mins/wk of moderate intensity (RPE 4-6)  exercise and encouraged to add 1-2 days of exercise on opposite days of cardiac rehab as tolerated.       NUTRITION PLAN    Patient's progress toward SMART and personal Nutrition goals:   Goals met: No concerns with diet choices and working on weight gain. High score RYP.  and Patient will be encouraged to maintain lifestyle modifications following discharge.     Group and Individual Education:   heart healthy eating principles  maintaining hydration  group class:  Label Reading    Plan for next 30 days:   After discharge patient will continue to maintain healthy lifestyle habits and focus on risk factor modification.    Measurable goals were based Rate Your Plate Dietary Self-Assessment. These are the areas in which the patient could score higher on the assessment. Goals include recommendations for a heart healthy diet based on American Heart Association.      PSYCHOSOCIAL PLAN    Patient's progress toward SMART and personal Psychosocial goals:   Goals met: denies depression and managing anxiety better, overall feeling better, and good support when needed at home. Enjoyed coming to rehab and Patient will be encouraged to maintain lifestyle modifications following discharge.     Group and Individual Education: stress management techniques, tools to manage fear/anxiety related to becoming SOB, benefits of mental health counseling, and class:  Stress and Pulmonary Disease    Plan for next 30 days:   After discharge patient will continue to maintain healthy lifestyle habits and focus on risk factor modification.    Information to utilize [employer] was provided as well as contact information for counseling through  [facility] and group psychotherapy groups available.      OTHER CORE COMPONENTS PLAN    Blood pressure:    Restin//72   Exercise: 94//78   Recovery: 92//70    Pt will be encouraged to monitor home BP if advised by cardiologist.    Progress toward SMART and personal Core  Component goals:   Goals met: medication compliance, blood pressures normal at rest and exercise, monitors pulse ox at home, and drinking enough water/watching sodium intake. Has appt in November with [facility] Cess[facility] at [facility], Goals not met: continues to smoke, and Patient will be encouraged to maintain lifestyle modifications following discharge.      Group and Individual Education:  effects of nicotine and tobacco, identifying smoking triggers, how to set a smoking cessation plan, smoking cessation tools and methods, and Education: Medication Management, Education: Control Breathing, Education: Pulmonary Anatomy, Education: COPD management    Plan for next 30 days:   After discharge patient will continue to maintain healthy lifestyle habits and focus on risk factor modification.

## 2025-03-06 DIAGNOSIS — J44.9 COPD, SEVERE (HCC): Primary | ICD-10-CM

## 2025-03-06 RX ORDER — UMECLIDINIUM BROMIDE AND VILANTEROL TRIFENATATE 62.5; 25 UG/1; UG/1
1 POWDER RESPIRATORY (INHALATION) DAILY
Qty: 180 BLISTER | Refills: 8 | Status: SHIPPED | OUTPATIENT
Start: 2025-03-06

## 2025-03-06 NOTE — TELEPHONE ENCOUNTER
Reason for call:   [x] Refill   [] Prior Auth  [x] Other: patient allegy dr agarwal - Dr Mariajose Worrell to take over refills     Office:   [] PCP/Provider -   [x] Specialty/Provider - Mariajose Worrell / pulm adam     Medication: Anoro Ellipta 62.5-25 MCG/ACT inhaler     Dose/Frequency: 1 puff daily     Quantity: 180    Pharmacy: [id number] [place], [place] - [address]   Does the patient have enough for 3 days?   [] Yes   [x] No - Send as HP to POD

## 2025-04-04 ENCOUNTER — OFFICE VISIT (OUTPATIENT)
Dept: PULMONOLOGY | Facility: CLINIC | Age: 80
End: 2025-04-04
Payer: MEDICARE

## 2025-04-04 VITALS
OXYGEN SATURATION: 92 % | DIASTOLIC BLOOD PRESSURE: 56 MMHG | BODY MASS INDEX: 16.01 KG/M2 | SYSTOLIC BLOOD PRESSURE: 110 MMHG | WEIGHT: 93.8 LBS | HEIGHT: 64 IN | HEART RATE: 82 BPM | TEMPERATURE: 96.1 F

## 2025-04-04 DIAGNOSIS — J44.9 COPD, SEVERE (HCC): Primary | ICD-10-CM

## 2025-04-04 DIAGNOSIS — J96.11 CHRONIC RESPIRATORY FAILURE WITH HYPOXIA (HCC): ICD-10-CM

## 2025-04-04 DIAGNOSIS — F17.210 CIGARETTE NICOTINE DEPENDENCE WITHOUT COMPLICATION: ICD-10-CM

## 2025-04-04 PROCEDURE — 94618 PULMONARY STRESS TESTING: CPT | Performed by: INTERNAL MEDICINE

## 2025-04-04 PROCEDURE — 99214 OFFICE O/P EST MOD 30 MIN: CPT | Performed by: INTERNAL MEDICINE

## 2025-04-04 RX ORDER — ALBUTEROL SULFATE 0.83 MG/ML
2.5 SOLUTION RESPIRATORY (INHALATION) EVERY 6 HOURS PRN
Qty: 270 ML | Refills: 5 | Status: SHIPPED | OUTPATIENT
Start: 2025-04-04

## 2025-04-04 NOTE — ASSESSMENT & PLAN NOTE
Patient has advanced COPD with hyperinflation and continues to struggle with symptoms of daily shortness of breath.  She is using Anoro Ellipta.  We have elected not to pursue any further inhaled corticosteroids due to prior episode of thrush and pneumonia.  I will provide her with a nebulizer to use albuterol on as-needed basis, as often as every 4 hours.  She has already completed pulmonary rehabilitation.  She continues to smoke and therefore is not a candidate for advanced therapies such as bronchoscopic lung volume reduction.    Orders:    Nebulizer    albuterol (2.5 mg/3 mL) 0.083 % nebulizer solution; Take 3 mL (2.5 mg total) by nebulization every 6 (six) hours as needed for wheezing or shortness of breath    POCT Oxygen Titration    CT chest without contrast; Future

## 2025-04-04 NOTE — PROGRESS NOTES
Follow-up  Visit - Pulmonary Medicine   Name: Estela Javed      : 1945      MRN: [id number]  Encounter Provider: Mariajose Worrell DO  Encounter Date: 2025   Encounter department: [facility]  :  Assessment & Plan  COPD, severe (HCC)  Patient has advanced COPD with hyperinflation and continues to struggle with symptoms of daily shortness of breath.  She is using Anoro Ellipta.  We have elected not to pursue any further inhaled corticosteroids due to prior episode of thrush and pneumonia.  I will provide her with a nebulizer to use albuterol on as-needed basis, as often as every 4 hours.  She has already completed pulmonary rehabilitation.  She continues to smoke and therefore is not a candidate for advanced therapies such as bronchoscopic lung volume reduction.    Orders:    Nebulizer    albuterol (2.5 mg/3 mL) 0.083 % nebulizer solution; Take 3 mL (2.5 mg total) by nebulization every 6 (six) hours as needed for wheezing or shortness of breath    POCT Oxygen Titration    CT chest without contrast; Future    Cigarette nicotine dependence without complication  Patient has a longstanding smoking history and continues to smoke a few cigarettes almost daily.    I discussed with her that she is a candidate for lung cancer CT screening.     The following Shared Decision-Making points were covered:  Benefits of screening were discussed, including the rates of reduction in death from lung cancer and other causes.  Harms of screening were reviewed, including false positive tests, radiation exposure levels, risks of invasive procedures, risks of complications of screening, and risk of overdiagnosis.  I counseled on the importance of adherence to annual lung cancer LDCT screening, impact of co-morbidities, and ability or willingness to undergo diagnosis and treatment.  I counseled on the importance of maintaining abstinence as a former smoker or was counseled on the importance of  smoking cessation if a current smoker    Review of Eligibility Criteria: She meets all of the criteria for Lung Cancer Screening.   She is 80 y.o.   She has 20 pack year tobacco history and is a current smoker or has quit within the past 15 years  She presents no signs or symptoms of lung cancer    After discussion, the patient decided to elect lung cancer screening.    Chronic respiratory failure with hypoxia (HCC)  Patient noted to be hypoxemic with ambulation, with saturations dropping down to 87%.  She requires supplemental oxygen at 2L/min.  A prescription to [facility] to get her portable oxygen concentrator.  Orders:    Home Oxygen with Portability      Return in about 6 months (around 10/4/2025).    History of Present Illness   History of Present Illness  The patient is a 80-year-old female who presents for evaluation of respiratory issues.    She reports a progressive decline in her respiratory function, characterized by an increase in dyspneic episodes. She has observed a correlation between these episodes and a decrease in barometric pressure. She experiences a persistent cough, which she occasionally induces to facilitate expectoration. The production of mucus is most prominent in the morning, with sporadic occurrences throughout the day. She does not possess a nebulizer at home and has never utilized one. On certain days, she perceives a need for supplemental oxygen. She completed pulmonary rehabilitation in 10/2024. She continues to use Anoro inhaler, which she finds beneficial. She has not required emergency room visits or hospital admissions since her last consultation. She has been exposed to RSV through her granddaughter but has not contracted the virus. She is due for a COVID-19 booster vaccine. She has been smoking intermittently, consuming 3 to 4 cigarettes on some days and abstaining on others. She acknowledges the psychological aspect of her smoking habit and expresses regret over her  "inability to quit permanently. She reports no wheezing or lower extremity edema. She recalls being a fast walker prior to her pulmonary rehabilitation but has since adapted to a slower pace. She has sufficient refills of her Anoro inhaler. She has increased her use of albuterol, particularly before engaging in physical activity. She expresses concern about the potential need for Trelegy due to its steroid content, citing a previous episode of thrush and subsequent pneumonia following a brief course of Breztri.    SOCIAL HISTORY  The patient admits to smoking three to four cigarettes on some days but not every day.    MEDICATIONS  Current: Anoro inhaler, albuterol  Past: Breztri  Review of Systems    Aside from what is mentioned in the HPI, ROS is otherwise negative         Medical History Reviewed by provider this encounter:     .    Objective   /56 (BP Location: Left arm, Patient Position: Sitting, Cuff Size: Standard)   Pulse 82   Temp (!) 96.1 °F (35.6 °C) (Temporal) Comment: Patient states she always runs low  Ht 5' 4\" (1.626 m)   Wt 42.5 kg (93 lb 12.8 oz)   SpO2 92%   BMI 16.10 kg/m²     Physical Exam  Vitals reviewed.   Constitutional:       General: She is not in acute distress.     Appearance: She is well-developed.   Eyes:      General: No scleral icterus.  Neck:      Vascular: No JVD.   Cardiovascular:      Rate and Rhythm: Normal rate and regular rhythm.   Pulmonary:      Breath sounds: No wheezing, rhonchi or rales.      Comments: Decreased throughout  Abdominal:      Tenderness: There is no abdominal tenderness.   Skin:     General: Skin is warm and dry.   Neurological:      Mental Status: She is alert and oriented to person, place, and time.   Psychiatric:         Mood and Affect: Mood normal.           Diagnostic Data:    Radiology results:  Radiology Results Review: I personally reviewed the following image studies in PACS and associated radiology reports: CT chest. My interpretation of " "the radiology images/reports is:  .  CT of the chest from 3/14/2024 shows severe emphysema, mild right upper lobe infiltrate, no pulmonary embolism    CT of the chest on 6/3/2024 shows right upper lobe infiltrate resolved, otherwise stable emphysema    PFT/spirometry results:  No results found for: \"FEV1\", \"FVC\", \"XEH7QBP\", \"TLC\", \"DLCO\"   Pulmonary function testing from June 2023 shows very severe obstruction    FEV1/FVC Ratio: 44 % (60% predicted)  Forced Vital Capacity: 1.66 L    59 % predicted  FEV1: 0.73 L33 % predicted  After administration of bronchodilator FEV1: 0.74 (+1%)     Lung volumes by body plethysmography: Total Lung Capacity 108 % predicted Residual volume 161 % predicted  RV/TLC ratio 147%     DLCO corrected for patients hemoglobin level: 10 %    Oximetry testing: Oxygen titration study was performed in the office today.  Baseline saturations are 92% on room air.  Patient ambulated for 2 minutes with saturations dropping to 87% on room air.  She was then placed on supplemental oxygen and requires 3 L/min to maintain saturations of 92 with ambulation.    Mariajose Worrell, DO      "

## 2025-04-04 NOTE — ASSESSMENT & PLAN NOTE
Patient has a longstanding smoking history and continues to smoke a few cigarettes almost daily.    I discussed with her that she is a candidate for lung cancer CT screening.     The following Shared Decision-Making points were covered:  Benefits of screening were discussed, including the rates of reduction in death from lung cancer and other causes.  Harms of screening were reviewed, including false positive tests, radiation exposure levels, risks of invasive procedures, risks of complications of screening, and risk of overdiagnosis.  I counseled on the importance of adherence to annual lung cancer LDCT screening, impact of co-morbidities, and ability or willingness to undergo diagnosis and treatment.  I counseled on the importance of maintaining abstinence as a former smoker or was counseled on the importance of smoking cessation if a current smoker    Review of Eligibility Criteria: She meets all of the criteria for Lung Cancer Screening.   She is 80 y.o.   She has 20 pack year tobacco history and is a current smoker or has quit within the past 15 years  She presents no signs or symptoms of lung cancer    After discussion, the patient decided to elect lung cancer screening.

## 2025-04-04 NOTE — ASSESSMENT & PLAN NOTE
Patient noted to be hypoxemic with ambulation, with saturations dropping down to 87%.  She requires supplemental oxygen at 2L/min.  A prescription to [facility] to get her portable oxygen concentrator.  Orders:    Home Oxygen with Portability

## 2025-04-10 ENCOUNTER — TELEPHONE (OUTPATIENT)
Age: 80
End: 2025-04-10

## 2025-04-10 LAB
DME PARACHUTE DELIVERY DATE ACTUAL: NORMAL
DME PARACHUTE DELIVERY DATE REQUESTED: NORMAL
DME PARACHUTE ITEM DESCRIPTION: NORMAL
DME PARACHUTE ORDER STATUS: NORMAL
DME PARACHUTE SUPPLIER NAME: NORMAL
DME PARACHUTE SUPPLIER PHONE: NORMAL

## 2025-04-10 NOTE — TELEPHONE ENCOUNTER
Pt called in to r/s her July appointment for September , I advise the patient we will add her to  recall list for September .

## 2025-04-18 LAB

## 2025-04-24 DIAGNOSIS — J44.9 COPD, SEVERE (HCC): ICD-10-CM

## 2025-04-24 RX ORDER — ALBUTEROL SULFATE 90 UG/1
2 INHALANT RESPIRATORY (INHALATION) EVERY 6 HOURS PRN
Qty: 18 G | Refills: 3 | Status: SHIPPED | OUTPATIENT
Start: 2025-04-24

## 2025-04-24 NOTE — TELEPHONE ENCOUNTER
Reason for call:   [x] Refill   [] Prior Auth  [] Other:     Office:   [] PCP/Provider -   [x] Specialty/Provider - [facility]     Medication:  albuterol (Ventolin HFA) 90 mcg/act inhaler    Dose/Frequency:  Inhale 2 puffs every 6 (six) hours as needed for wheezing,     Quantity: 18 g    Pharmacy: [id number] [address]      Local Pharmacy   Does the patient have enough for 3 days?   [x] Yes   [] No - Send as HP to POD    Mail Away Pharmacy   Does the patient have enough for 10 days?   [] Yes   [] No - Send as HP to POD